# Patient Record
Sex: FEMALE | Race: OTHER | HISPANIC OR LATINO | Employment: FULL TIME | URBAN - METROPOLITAN AREA
[De-identification: names, ages, dates, MRNs, and addresses within clinical notes are randomized per-mention and may not be internally consistent; named-entity substitution may affect disease eponyms.]

---

## 2018-08-09 ENCOUNTER — HOSPITAL ENCOUNTER (EMERGENCY)
Facility: HOSPITAL | Age: 23
Discharge: HOME | End: 2018-08-09
Attending: EMERGENCY MEDICINE | Admitting: EMERGENCY MEDICINE

## 2018-08-09 VITALS
DIASTOLIC BLOOD PRESSURE: 61 MMHG | HEART RATE: 85 BPM | TEMPERATURE: 98.2 F | OXYGEN SATURATION: 100 % | SYSTOLIC BLOOD PRESSURE: 129 MMHG | RESPIRATION RATE: 16 BRPM | WEIGHT: 162.6 LBS

## 2018-08-09 DIAGNOSIS — R04.0 ANTERIOR EPISTAXIS: Primary | ICD-10-CM

## 2018-08-09 PROCEDURE — 99283 EMERGENCY DEPT VISIT LOW MDM: CPT

## 2018-08-09 RX ORDER — SODIUM CHLORIDE/ALOE VERA
SPRAY, NON-AEROSOL (ML) NASAL
Qty: 22 ML | Refills: 0 | Status: SHIPPED | OUTPATIENT
Start: 2018-08-09 | End: 2019-06-09

## 2018-08-09 RX ORDER — OXYMETAZOLINE HYDROCHLORIDE 0.05 G/100ML
2 SPRAY NASAL ONCE
Status: COMPLETED | OUTPATIENT
Start: 2018-08-09 | End: 2018-08-09

## 2018-08-09 RX ADMIN — OXYMETAZOLINE HYDROCHLORIDE 2 SPRAY: 5 SPRAY NASAL at 20:00

## 2018-08-10 NOTE — ED PROVIDER NOTES
History  Chief Complaint   Patient presents with   Darliss Card     Patient presents to ED after waking up with nose bleed this morning  Patient has a nose bleed 2 weeks ago as well and her significant other states, "this isn't normal so it needs to be checked out " Patient denies any bleeding since this morning  Patient presents emergency department with nose bleeds  She states she has not had problems with nosebleeds before  She reports 3 episodes of bleeding from the right nostril today  Each time it lasts for about 2 min and stops on its own  She states she gets a headache when she gets the nose bleed but then the headache goes away  She is not having any symptoms at this time  Denies any trauma or digital trauma  None       Past Medical History:   Diagnosis Date    Asthma        History reviewed  No pertinent surgical history  History reviewed  No pertinent family history  I have reviewed and agree with the history as documented  Social History   Substance Use Topics    Smoking status: Never Smoker    Smokeless tobacco: Never Used    Alcohol use No        Review of Systems   All other systems reviewed and are negative  Physical Exam  Physical Exam   Constitutional: She appears well-developed and well-nourished  No distress  HENT:   Head: Normocephalic and atraumatic  Mouth/Throat: Oropharynx is clear and moist    Small crust to the right nostril suspect area of bleeding no active bleeding now  No blood in pharynx  Eyes: Conjunctivae and EOM are normal    Neck: Neck supple  Cardiovascular: Normal rate, regular rhythm and normal heart sounds  Pulmonary/Chest: Effort normal and breath sounds normal    Neurological: She is alert  Skin: Skin is warm  Psychiatric: She has a normal mood and affect  Nursing note and vitals reviewed        Vital Signs  ED Triage Vitals [08/09/18 1931]   Temperature Pulse Respirations Blood Pressure SpO2   98 2 °F (36 8 °C) 85 16 129/61 100 %      Temp Source Heart Rate Source Patient Position - Orthostatic VS BP Location FiO2 (%)   Oral Monitor Sitting Right arm --      Pain Score       No Pain           Vitals:    08/09/18 1931   BP: 129/61   Pulse: 85   Patient Position - Orthostatic VS: Sitting       Visual Acuity      ED Medications  Medications   oxymetazoline (AFRIN) 0 05 % nasal spray 2 spray (2 sprays Each Nare Given 8/9/18 2000)       Diagnostic Studies  Results Reviewed     None                 No orders to display              Procedures  Procedures       Phone Contacts  ED Phone Contact    ED Course                               MDM  Number of Diagnoses or Management Options  Anterior epistaxis: new and does not require workup  Risk of Complications, Morbidity, and/or Mortality  General comments: Instructions reviewed with patient and family  Patient Progress  Patient progress: stable    CritCare Time    Disposition  Final diagnoses:   Anterior epistaxis     Time reflects when diagnosis was documented in both MDM as applicable and the Disposition within this note     Time User Action Codes Description Comment    8/9/2018  8:01 PM Rosalina Russo Add [R04 0] Anterior epistaxis       ED Disposition     ED Disposition Condition Comment    Discharge  Beny Garcia discharge to home/self care  Condition at discharge: Good        Follow-up Information     Follow up With Specialties Details Why [address]    [address] [facility],  Otolaryngology   [address] [facility] [id number] [phone]            Patient's Medications   Discharge Prescriptions    AYR SALINE NASAL NO-DRIP GEL    2 sprays each nostril as needed       Start Date: 8/9/2018  End Date: --       Order Dose: --       Quantity: 22 mL    Refills: 0     No discharge procedures on file      ED Provider  Electronically Signed by           Luba Solorio PA-C  08/09/18 2007

## 2018-08-10 NOTE — DISCHARGE INSTRUCTIONS
Afrin - 3 sprays each nostril 2x tomorrow and Saturday  If nose bleeds - pinch and hold for 5 minutes  And use afrin again  Saline nasal spray 2 sprays each nostril 7-10x daily    Epistaxis   WHAT YOU SHOULD KNOW:   Epistaxis is a nosebleed  A nosebleed occurs when the blood vessels near the surface of the nasal cavity are injured or damaged  AFTER YOU LEAVE:   Medicines:   · Nasal sprays:  Vasoconstrictor nasal spray is a medicine that helps make nasal blood vessels narrower  This limits the blood flow and stops the bleeding  This medicine also decreases the swelling inside your nose and helps you breathe easier  You may also be directed to use saline or other nasal sprays to add moisture to your nose  · Antibiotics: This medicine is given to help treat or prevent an infection caused by bacteria  · Take your medicine as directed  Call your healthcare provider if you think your medicine is not helping or if you have side effects  Tell him if you are allergic to any medicine  Keep a list of the medicines, vitamins, and herbs you take  Include the amounts, and when and why you take them  Bring the list or the pill bottles to follow-up visits  Carry your medicine list with you in case of an emergency  Follow up with your primary healthcare provider or otolaryngologist within 2 to 3 days or as directed: Any packing in your nose should be removed within 2 to 3 days  Write down your questions so you remember to ask them during your visits  First aid:   · Sit up and lean forward: This will help prevent you from swallowing blood  Spit blood and saliva into a bowl  · Apply pressure to your nose:  Use 2 fingers to pinch your nose shut for 10 minutes  This will help stop the bleeding  Breathe through your mouth  · Apply ice:  Use a cold pack or put crushed ice in a bag, cover with a towel, and place on the bridge of your nose       · Nasal packing:  Pack your nose with a cotton ball, tissue, tampon, or gauze bandage to stop the bleeding  Prevent epistaxis:  · Avoid nose picking and blowing your nose too hard: You can irritate or damage your nose if you pick it  Blowing your nose too hard may cause the bleeding to start again  · Avoid irritants:  Substances that can irritate your nose should be avoided  These include tobacco smoke and chemical sprays such as  that contain ammonia  · Use a cool mist humidifier in your home: This will add the moisture to the air and help keep your nose moist      · Put a small amount of petroleum jelly inside your nostrils: You may apply a small amount of petroleum jelly if you do not have a nasal packing  This will help keep your nose from drying out or getting irritated  Do not put anything else inside your nose unless your primary healthcare provider tells you to do so  Contact your primary healthcare provider or otolaryngologist if:   · You have a fever and are vomiting  · You have pain in and around your nose that is getting worse even after you take pain medicines  · Your nasal pack is loose  · You have questions or concerns about your condition or care  Seek care immediately if:   · Your nasal packing is soaked with blood  · Your nose is still bleeding after 20 minutes, even after you pinch it  · You have a foul-smelling discharge coming out of your nose  · You feel so weak and dizzy that you have trouble standing up  · You have trouble breathing or talking  © 2014 [address] is for End User's use only and may not be sold, redistributed or otherwise used for commercial purposes  All illustrations and images included in CareNotes® are the copyrighted property of [employer] , "[employer]  or Peter Sandra  The above information is an  only  It is not intended as medical advice for individual conditions or treatments   Talk to your doctor, nurse or pharmacist before following any medical regimen to see if it is safe and effective for you

## 2019-05-30 ENCOUNTER — HOSPITAL ENCOUNTER (EMERGENCY)
Facility: HOSPITAL | Age: 24
Discharge: HOME | End: 2019-05-30
Attending: EMERGENCY MEDICINE | Admitting: EMERGENCY MEDICINE

## 2019-05-30 VITALS
TEMPERATURE: 97.4 F | HEART RATE: 95 BPM | WEIGHT: 164.46 LBS | DIASTOLIC BLOOD PRESSURE: 58 MMHG | SYSTOLIC BLOOD PRESSURE: 111 MMHG | OXYGEN SATURATION: 100 % | RESPIRATION RATE: 18 BRPM

## 2019-05-30 DIAGNOSIS — R11.2 NAUSEA VOMITING AND DIARRHEA: ICD-10-CM

## 2019-05-30 DIAGNOSIS — R19.7 NAUSEA VOMITING AND DIARRHEA: ICD-10-CM

## 2019-05-30 DIAGNOSIS — R10.9 ACUTE ABDOMINAL PAIN: Primary | ICD-10-CM

## 2019-05-30 LAB
BACTERIA UR QL AUTO: ABNORMAL /HPF
BILIRUB UR QL STRIP: NEGATIVE
CLARITY UR: CLEAR
COLOR UR: YELLOW
EXT PREG TEST URINE: NORMAL
GLUCOSE UR STRIP-MCNC: NEGATIVE MG/DL
HGB UR QL STRIP.AUTO: ABNORMAL
KETONES UR STRIP-MCNC: NEGATIVE MG/DL
LEUKOCYTE ESTERASE UR QL STRIP: ABNORMAL
NITRITE UR QL STRIP: NEGATIVE
NON-SQ EPI CELLS URNS QL MICRO: ABNORMAL /HPF
PH UR STRIP.AUTO: 6 [PH] (ref 4.5–8)
PROT UR STRIP-MCNC: NEGATIVE MG/DL
RBC #/AREA URNS AUTO: ABNORMAL /HPF
SP GR UR STRIP.AUTO: 1.02 (ref 1–1.03)
UROBILINOGEN UR QL STRIP.AUTO: 0.2 E.U./DL
WBC #/AREA URNS AUTO: ABNORMAL /HPF

## 2019-05-30 PROCEDURE — 87491 CHLMYD TRACH DNA AMP PROBE: CPT | Performed by: EMERGENCY MEDICINE

## 2019-05-30 PROCEDURE — 81001 URINALYSIS AUTO W/SCOPE: CPT

## 2019-05-30 PROCEDURE — 87591 N.GONORRHOEAE DNA AMP PROB: CPT | Performed by: EMERGENCY MEDICINE

## 2019-05-30 PROCEDURE — 99283 EMERGENCY DEPT VISIT LOW MDM: CPT | Performed by: EMERGENCY MEDICINE

## 2019-05-30 PROCEDURE — 81025 URINE PREGNANCY TEST: CPT | Performed by: EMERGENCY MEDICINE

## 2019-05-30 PROCEDURE — 99284 EMERGENCY DEPT VISIT MOD MDM: CPT

## 2019-05-30 RX ORDER — ONDANSETRON 4 MG/1
4 TABLET, ORALLY DISINTEGRATING ORAL ONCE
Status: COMPLETED | OUTPATIENT
Start: 2019-05-30 | End: 2019-05-30

## 2019-05-30 RX ORDER — DICYCLOMINE HCL 20 MG
20 TABLET ORAL ONCE
Status: COMPLETED | OUTPATIENT
Start: 2019-05-30 | End: 2019-05-30

## 2019-05-30 RX ORDER — DICYCLOMINE HCL 20 MG
20 TABLET ORAL 2 TIMES DAILY
Qty: 20 TABLET | Refills: 0 | Status: SHIPPED | OUTPATIENT
Start: 2019-05-30 | End: 2021-01-26

## 2019-05-30 RX ORDER — ONDANSETRON 4 MG/1
4 TABLET, FILM COATED ORAL EVERY 8 HOURS PRN
Qty: 7 TABLET | Refills: 0 | Status: SHIPPED | OUTPATIENT
Start: 2019-05-30 | End: 2019-06-09

## 2019-05-30 RX ADMIN — DICYCLOMINE HYDROCHLORIDE 20 MG: 20 TABLET ORAL at 11:29

## 2019-05-30 RX ADMIN — ONDANSETRON 4 MG: 4 TABLET, ORALLY DISINTEGRATING ORAL at 11:29

## 2019-05-31 LAB
C TRACH DNA SPEC QL NAA+PROBE: POSITIVE
N GONORRHOEA DNA SPEC QL NAA+PROBE: NEGATIVE

## 2019-06-09 ENCOUNTER — APPOINTMENT (EMERGENCY)
Dept: ULTRASOUND IMAGING | Facility: HOSPITAL | Age: 24
End: 2019-06-09

## 2019-06-09 ENCOUNTER — APPOINTMENT (EMERGENCY)
Dept: CT IMAGING | Facility: HOSPITAL | Age: 24
End: 2019-06-09

## 2019-06-09 ENCOUNTER — HOSPITAL ENCOUNTER (EMERGENCY)
Facility: HOSPITAL | Age: 24
Discharge: HOME | End: 2019-06-09
Attending: EMERGENCY MEDICINE | Admitting: EMERGENCY MEDICINE

## 2019-06-09 VITALS
SYSTOLIC BLOOD PRESSURE: 118 MMHG | TEMPERATURE: 97.5 F | HEART RATE: 95 BPM | DIASTOLIC BLOOD PRESSURE: 68 MMHG | OXYGEN SATURATION: 100 % | WEIGHT: 167.11 LBS | RESPIRATION RATE: 16 BRPM

## 2019-06-09 DIAGNOSIS — N83.209 RUPTURED OVARIAN CYST: Primary | ICD-10-CM

## 2019-06-09 DIAGNOSIS — K66.1 HEMOPERITONEUM: ICD-10-CM

## 2019-06-09 LAB
ALBUMIN SERPL BCP-MCNC: 3.9 G/DL (ref 3.5–5)
ALP SERPL-CCNC: 68 U/L (ref 46–116)
ALT SERPL W P-5'-P-CCNC: 33 U/L (ref 12–78)
ANION GAP SERPL CALCULATED.3IONS-SCNC: 12 MMOL/L (ref 4–13)
AST SERPL W P-5'-P-CCNC: 16 U/L (ref 5–45)
BACTERIA UR QL AUTO: ABNORMAL /HPF
BASOPHILS # BLD AUTO: 0.06 THOUSANDS/ΜL (ref 0–0.1)
BASOPHILS NFR BLD AUTO: 1 % (ref 0–1)
BILIRUB SERPL-MCNC: 0.2 MG/DL (ref 0.2–1)
BILIRUB UR QL STRIP: NEGATIVE
BILIRUB UR QL STRIP: NEGATIVE
BUN SERPL-MCNC: 8 MG/DL (ref 5–25)
CALCIUM SERPL-MCNC: 9 MG/DL (ref 8.3–10.1)
CHLORIDE SERPL-SCNC: 105 MMOL/L (ref 100–108)
CLARITY UR: ABNORMAL
CLARITY UR: CLEAR
CO2 SERPL-SCNC: 22 MMOL/L (ref 21–32)
COLOR UR: YELLOW
COLOR UR: YELLOW
CREAT SERPL-MCNC: 0.64 MG/DL (ref 0.6–1.3)
EOSINOPHIL # BLD AUTO: 0.39 THOUSAND/ΜL (ref 0–0.61)
EOSINOPHIL NFR BLD AUTO: 4 % (ref 0–6)
ERYTHROCYTE [DISTWIDTH] IN BLOOD BY AUTOMATED COUNT: 12.8 % (ref 11.6–15.1)
EXT PREG TEST URINE: NEGATIVE
EXT PREG TEST URINE: NEGATIVE
GFR SERPL CREATININE-BSD FRML MDRD: 128 ML/MIN/1.73SQ M
GLUCOSE SERPL-MCNC: 100 MG/DL (ref 65–140)
GLUCOSE UR STRIP-MCNC: NEGATIVE MG/DL
GLUCOSE UR STRIP-MCNC: NEGATIVE MG/DL
HCT VFR BLD AUTO: 45.8 % (ref 34.8–46.1)
HGB BLD-MCNC: 15 G/DL (ref 11.5–15.4)
HGB UR QL STRIP.AUTO: NEGATIVE
HGB UR QL STRIP.AUTO: NEGATIVE
IMM GRANULOCYTES # BLD AUTO: 0.03 THOUSAND/UL (ref 0–0.2)
IMM GRANULOCYTES NFR BLD AUTO: 0 % (ref 0–2)
KETONES UR STRIP-MCNC: NEGATIVE MG/DL
KETONES UR STRIP-MCNC: NEGATIVE MG/DL
LEUKOCYTE ESTERASE UR QL STRIP: ABNORMAL
LEUKOCYTE ESTERASE UR QL STRIP: NEGATIVE
LIPASE SERPL-CCNC: 93 U/L (ref 73–393)
LYMPHOCYTES # BLD AUTO: 1.88 THOUSANDS/ΜL (ref 0.6–4.47)
LYMPHOCYTES NFR BLD AUTO: 19 % (ref 14–44)
MCH RBC QN AUTO: 31.1 PG (ref 26.8–34.3)
MCHC RBC AUTO-ENTMCNC: 32.8 G/DL (ref 31.4–37.4)
MCV RBC AUTO: 95 FL (ref 82–98)
MONOCYTES # BLD AUTO: 0.42 THOUSAND/ΜL (ref 0.17–1.22)
MONOCYTES NFR BLD AUTO: 4 % (ref 4–12)
NEUTROPHILS # BLD AUTO: 6.92 THOUSANDS/ΜL (ref 1.85–7.62)
NEUTS SEG NFR BLD AUTO: 72 % (ref 43–75)
NITRITE UR QL STRIP: NEGATIVE
NITRITE UR QL STRIP: NEGATIVE
NON-SQ EPI CELLS URNS QL MICRO: ABNORMAL /HPF
NRBC BLD AUTO-RTO: 0 /100 WBCS
PH UR STRIP.AUTO: 8.5 [PH] (ref 4.5–8)
PH UR STRIP.AUTO: >=9 [PH] (ref 4.5–8)
PLATELET # BLD AUTO: 320 THOUSANDS/UL (ref 149–390)
PMV BLD AUTO: 9.1 FL (ref 8.9–12.7)
POTASSIUM SERPL-SCNC: 3.5 MMOL/L (ref 3.5–5.3)
PROT SERPL-MCNC: 7.6 G/DL (ref 6.4–8.2)
PROT UR STRIP-MCNC: ABNORMAL MG/DL
PROT UR STRIP-MCNC: NEGATIVE MG/DL
RBC # BLD AUTO: 4.83 MILLION/UL (ref 3.81–5.12)
RBC #/AREA URNS AUTO: ABNORMAL /HPF
SODIUM SERPL-SCNC: 139 MMOL/L (ref 136–145)
SP GR UR STRIP.AUTO: 1.01 (ref 1–1.03)
SP GR UR STRIP.AUTO: 1.02 (ref 1–1.03)
UROBILINOGEN UR QL STRIP.AUTO: 0.2 E.U./DL
UROBILINOGEN UR QL STRIP.AUTO: 0.2 E.U./DL
WBC # BLD AUTO: 9.7 THOUSAND/UL (ref 4.31–10.16)
WBC #/AREA URNS AUTO: ABNORMAL /HPF

## 2019-06-09 PROCEDURE — 96374 THER/PROPH/DIAG INJ IV PUSH: CPT

## 2019-06-09 PROCEDURE — 74177 CT ABD & PELVIS W/CONTRAST: CPT

## 2019-06-09 PROCEDURE — 81025 URINE PREGNANCY TEST: CPT | Performed by: EMERGENCY MEDICINE

## 2019-06-09 PROCEDURE — 36415 COLL VENOUS BLD VENIPUNCTURE: CPT | Performed by: PHYSICIAN ASSISTANT

## 2019-06-09 PROCEDURE — 76830 TRANSVAGINAL US NON-OB: CPT

## 2019-06-09 PROCEDURE — 99284 EMERGENCY DEPT VISIT MOD MDM: CPT | Performed by: PHYSICIAN ASSISTANT

## 2019-06-09 PROCEDURE — 96375 TX/PRO/DX INJ NEW DRUG ADDON: CPT

## 2019-06-09 PROCEDURE — 96361 HYDRATE IV INFUSION ADD-ON: CPT

## 2019-06-09 PROCEDURE — 81025 URINE PREGNANCY TEST: CPT | Performed by: PHYSICIAN ASSISTANT

## 2019-06-09 PROCEDURE — 99284 EMERGENCY DEPT VISIT MOD MDM: CPT

## 2019-06-09 PROCEDURE — 80053 COMPREHEN METABOLIC PANEL: CPT | Performed by: PHYSICIAN ASSISTANT

## 2019-06-09 PROCEDURE — 83690 ASSAY OF LIPASE: CPT | Performed by: PHYSICIAN ASSISTANT

## 2019-06-09 PROCEDURE — 81001 URINALYSIS AUTO W/SCOPE: CPT

## 2019-06-09 PROCEDURE — 96376 TX/PRO/DX INJ SAME DRUG ADON: CPT

## 2019-06-09 PROCEDURE — 85025 COMPLETE CBC W/AUTO DIFF WBC: CPT | Performed by: PHYSICIAN ASSISTANT

## 2019-06-09 PROCEDURE — 76856 US EXAM PELVIC COMPLETE: CPT

## 2019-06-09 RX ORDER — IBUPROFEN 600 MG/1
600 TABLET ORAL EVERY 6 HOURS PRN
Qty: 30 TABLET | Refills: 0 | Status: SHIPPED | OUTPATIENT
Start: 2019-06-09 | End: 2019-06-09 | Stop reason: SDUPTHER

## 2019-06-09 RX ORDER — IBUPROFEN 600 MG/1
600 TABLET ORAL EVERY 6 HOURS PRN
Qty: 30 TABLET | Refills: 0 | Status: SHIPPED | OUTPATIENT
Start: 2019-06-09 | End: 2021-01-26

## 2019-06-09 RX ORDER — ONDANSETRON 4 MG/1
4 TABLET, ORALLY DISINTEGRATING ORAL EVERY 8 HOURS PRN
Qty: 10 TABLET | Refills: 0 | Status: SHIPPED | OUTPATIENT
Start: 2019-06-09 | End: 2021-01-26

## 2019-06-09 RX ORDER — ONDANSETRON 2 MG/ML
4 INJECTION INTRAMUSCULAR; INTRAVENOUS ONCE
Status: COMPLETED | OUTPATIENT
Start: 2019-06-09 | End: 2019-06-09

## 2019-06-09 RX ORDER — KETOROLAC TROMETHAMINE 30 MG/ML
15 INJECTION, SOLUTION INTRAMUSCULAR; INTRAVENOUS ONCE
Status: COMPLETED | OUTPATIENT
Start: 2019-06-09 | End: 2019-06-09

## 2019-06-09 RX ORDER — ONDANSETRON 4 MG/1
4 TABLET, ORALLY DISINTEGRATING ORAL EVERY 8 HOURS PRN
Qty: 10 TABLET | Refills: 0 | Status: SHIPPED | OUTPATIENT
Start: 2019-06-09 | End: 2019-06-09 | Stop reason: SDUPTHER

## 2019-06-09 RX ADMIN — ONDANSETRON 4 MG: 2 INJECTION INTRAMUSCULAR; INTRAVENOUS at 07:14

## 2019-06-09 RX ADMIN — KETOROLAC TROMETHAMINE 15 MG: 30 INJECTION, SOLUTION INTRAMUSCULAR; INTRAVENOUS at 07:14

## 2019-06-09 RX ADMIN — SODIUM CHLORIDE 1000 ML: 0.9 INJECTION, SOLUTION INTRAVENOUS at 07:13

## 2019-06-09 RX ADMIN — IOHEXOL 100 ML: 350 INJECTION, SOLUTION INTRAVENOUS at 08:28

## 2019-06-09 RX ADMIN — ONDANSETRON 4 MG: 2 INJECTION INTRAMUSCULAR; INTRAVENOUS at 08:36

## 2019-06-12 ENCOUNTER — OFFICE VISIT (OUTPATIENT)
Dept: OBGYN CLINIC | Facility: CLINIC | Age: 24
End: 2019-06-12

## 2019-06-12 VITALS — DIASTOLIC BLOOD PRESSURE: 75 MMHG | HEART RATE: 99 BPM | WEIGHT: 162.4 LBS | SYSTOLIC BLOOD PRESSURE: 122 MMHG

## 2019-06-12 DIAGNOSIS — N83.201 HEMORRHAGIC CYST OF RIGHT OVARY: Chronic | ICD-10-CM

## 2019-06-12 DIAGNOSIS — N83.201 CYST OF RIGHT OVARY: Primary | ICD-10-CM

## 2019-06-12 PROCEDURE — 99213 OFFICE O/P EST LOW 20 MIN: CPT | Performed by: OBSTETRICS & GYNECOLOGY

## 2019-06-12 RX ORDER — DOCUSATE SODIUM 100 MG/1
100 CAPSULE, LIQUID FILLED ORAL 2 TIMES DAILY
Qty: 10 CAPSULE | Refills: 0 | Status: SHIPPED | OUTPATIENT
Start: 2019-06-12 | End: 2021-01-26

## 2019-07-09 ENCOUNTER — OFFICE VISIT (OUTPATIENT)
Dept: OBGYN CLINIC | Facility: CLINIC | Age: 24
End: 2019-07-09

## 2019-07-09 VITALS — SYSTOLIC BLOOD PRESSURE: 115 MMHG | WEIGHT: 163 LBS | DIASTOLIC BLOOD PRESSURE: 76 MMHG | HEART RATE: 83 BPM

## 2019-07-09 DIAGNOSIS — Z01.419 ENCOUNTER FOR ANNUAL ROUTINE GYNECOLOGICAL EXAMINATION: Primary | ICD-10-CM

## 2019-07-09 PROCEDURE — 99385 PREV VISIT NEW AGE 18-39: CPT | Performed by: NURSE PRACTITIONER

## 2019-07-09 NOTE — PROGRESS NOTES
Annual Exam    Assessment   1  Encounter for annual routine gynecological examination  CANCELED: Liquid-based pap, screening     well woman       Plan       All questions answered  Breast self exam technique reviewed and patient encouraged to perform self-exam monthly  Contraception: OCP (estrogen/progesterone)  Discussed healthy lifestyle modifications  Follow up in 1 year  Thin prep Pap smear  Patient Instructions   PAP results will be avaialble in 2 weeks  Call with needs or concerns  Return in 1 year   Pt and spouse verbalized understanding of all discussed    Subjective      Aida Daley is a 27 y o  No obstetric history on file  female who presents for annual well woman exam with her spouse Periods are regular every 28-30 days, lasting 5 days  No intermenstrual bleeding, spotting, or discharge  Pt states she thinks last WNL PAP was [facility] ago [facility]  Pt was here in  and had a C/O pelvic pain a 2 cm hemorrhagic and 1 cm simple cyst ovarian cyst was noted, pt was put on OCP's patient states pain has resolved, would like to continue OCP's  Current contraception: OCP (estrogen/progesterone)  History of abnormal Pap smear: no  Family history of uterine or ovarian cancer: no  Regular self breast exam: yes  History of abnormal mammogram: N/A  Family history of breast cancer: no  History of abnormal lipids: unknown  Menstrual History:  OB History        0    Para   0    Term   0       0    AB   0    Living   0       SAB   0    TAB   0    Ectopic   0    Multiple   0    Live Births   0                Menarche age: 17  Patient's last menstrual period was 2019  The following portions of the patient's history were reviewed and updated as appropriate: allergies, current medications, past family history, past medical history, past social history, past surgical history and problem list     Review of Systems  Pertinent items are noted in HPI        Objective /76   Pulse 83   Wt 73 9 kg (163 lb)   LMP 06/22/2019     General: alert and oriented, in no acute distress, alert, appears stated age and cooperative   Heart: regular rate and rhythm, S1, S2 normal, no murmur, click, rub or gallop   Lungs: clear to auscultation bilaterally   Thyroid: Negative masses   Abdomen: soft, non-tender, without masses or organomegaly   Vulva: Negative lesions or erythema   Vagina: normal mucosa   Cervix: no cervical motion tenderness and no lesions   Uterus: normal size, non-tender, normal shape and consistency   Adnexa: normal adnexa   Urethra: normal   Breasts: NT,negative masses, discharge, or dimpling

## 2019-07-17 PROBLEM — R87.610 ATYPICAL SQUAMOUS CELLS OF UNDETERMINED SIGNIFICANCE (ASCUS) ON PAPANICOLAOU SMEAR OF CERVIX: Status: ACTIVE | Noted: 2019-07-17

## 2019-07-25 ENCOUNTER — TELEPHONE (OUTPATIENT)
Dept: OBGYN CLINIC | Facility: CLINIC | Age: 24
End: 2019-07-25

## 2019-09-24 ENCOUNTER — OFFICE VISIT (OUTPATIENT)
Dept: OBGYN CLINIC | Facility: CLINIC | Age: 24
End: 2019-09-24

## 2019-09-24 VITALS — DIASTOLIC BLOOD PRESSURE: 66 MMHG | SYSTOLIC BLOOD PRESSURE: 99 MMHG | HEART RATE: 84 BPM | WEIGHT: 156.8 LBS

## 2019-09-24 DIAGNOSIS — N83.201 CYST OF RIGHT OVARY: ICD-10-CM

## 2019-09-24 DIAGNOSIS — R87.610 ATYPICAL SQUAMOUS CELLS OF UNDETERMINED SIGNIFICANCE (ASCUS) ON PAPANICOLAOU SMEAR OF CERVIX: Primary | ICD-10-CM

## 2019-09-24 PROCEDURE — 99213 OFFICE O/P EST LOW 20 MIN: CPT | Performed by: OBSTETRICS & GYNECOLOGY

## 2019-09-24 NOTE — PROGRESS NOTES
Subjective:      #: [id number]    Haylie Hargrove is a 27 y o  [de-identified] female who presents for pap smear results and birth control refill  Patient was started on OCP 6/12/2019 after being evaluated in ED for pelvic pain and was found to have a hemorrhagic cyst and a simple cyst  Patient states her pelvic pain has subsided and she is doing well  She is doing well with her OCP and would like one year refill  Objective:    Vitals: Blood pressure 99/66, pulse 84, weight 71 1 kg (156 lb 12 8 oz)  There is no height or weight on file to calculate BMI      Assessment/Plan:    Pelvic pain, resolved  Patient would like to continue Lo/Ovral for contraception, script given for 1 year refill    ASCUS, HPV unknown  According to ASCCP guidelines, patient needs repeat cytology in 1 year  She will return for both pap smear and OCP refill in 1 year    Lulu Martin MD  OBGYN, PGY-3  9/24/2019  9:12 AM

## 2020-07-16 ENCOUNTER — TELEPHONE (OUTPATIENT)
Dept: OBGYN CLINIC | Facility: CLINIC | Age: 25
End: 2020-07-16

## 2020-07-16 NOTE — TELEPHONE ENCOUNTER
Malaika at the  called the patient to schedule an annual  Patient is going to make her apt through the B

## 2021-01-26 ENCOUNTER — HOSPITAL ENCOUNTER (EMERGENCY)
Facility: HOSPITAL | Age: 26
Discharge: HOME | End: 2021-01-26
Attending: EMERGENCY MEDICINE

## 2021-01-26 VITALS
DIASTOLIC BLOOD PRESSURE: 67 MMHG | TEMPERATURE: 99 F | OXYGEN SATURATION: 99 % | SYSTOLIC BLOOD PRESSURE: 131 MMHG | RESPIRATION RATE: 16 BRPM | HEART RATE: 108 BPM | WEIGHT: 168.43 LBS

## 2021-01-26 DIAGNOSIS — J02.9 PHARYNGITIS: ICD-10-CM

## 2021-01-26 DIAGNOSIS — B34.9 VIRAL ILLNESS: Primary | ICD-10-CM

## 2021-01-26 DIAGNOSIS — J02.0 STREP PHARYNGITIS: ICD-10-CM

## 2021-01-26 LAB
FLUAV RNA RESP QL NAA+PROBE: NEGATIVE
FLUBV RNA RESP QL NAA+PROBE: NEGATIVE
RSV RNA RESP QL NAA+PROBE: NEGATIVE
S PYO DNA THROAT QL NAA+PROBE: DETECTED
SARS-COV-2 RNA RESP QL NAA+PROBE: NEGATIVE

## 2021-01-26 PROCEDURE — 99283 EMERGENCY DEPT VISIT LOW MDM: CPT

## 2021-01-26 PROCEDURE — 99284 EMERGENCY DEPT VISIT MOD MDM: CPT | Performed by: PHYSICIAN ASSISTANT

## 2021-01-26 PROCEDURE — 0241U HB NFCT DS VIR RESP RNA 4 TRGT: CPT | Performed by: PHYSICIAN ASSISTANT

## 2021-01-26 PROCEDURE — 87651 STREP A DNA AMP PROBE: CPT | Performed by: PHYSICIAN ASSISTANT

## 2021-01-26 RX ORDER — AMOXICILLIN 500 MG/1
500 CAPSULE ORAL EVERY 12 HOURS SCHEDULED
Qty: 20 CAPSULE | Refills: 0 | Status: SHIPPED | OUTPATIENT
Start: 2021-01-26 | End: 2021-02-05

## 2021-01-26 RX ORDER — IBUPROFEN 600 MG/1
600 TABLET ORAL ONCE
Status: COMPLETED | OUTPATIENT
Start: 2021-01-26 | End: 2021-01-26

## 2021-01-26 RX ADMIN — IBUPROFEN 600 MG: 600 TABLET, FILM COATED ORAL at 08:28

## 2021-01-26 NOTE — Clinical Note
Oanh Nice was seen and treated in our emergency department on 1/26/2021  Diagnosis:      [place]     She may return on this date:      Patient must quarantine while awaiting Covid-19 test results  If negative, she may return to work after 3 days without symptoms  If you have any questions or concerns, please don't hesitate to call        Landon Hair PA-C    ______________________________           _______________          _______________  Hospital Representative                              Date                                Time

## 2021-01-26 NOTE — ED PROVIDER NOTES
History  Chief Complaint   Patient presents with    Flu Symptoms     pt c/o chills, body aches, subjective fevers and sore throat since yesterday, denies covid exposure  took tylenol at 0200 today and theraflu this am      Patient is a 24 y/o female presenting to the ED with a sore throat, generalized body aches chills and subjective fevers x1 day  She is having difficulty swallowing due to the pain in her throat  She took Tylenol at 0200 and Theraflu 1 hour prior to coming to ED with some relief of symptoms  Patient denies any known covid exposures  She denies dizziness, headaches, chest pain, SOB, palpitations, abdominal pain or N/V/D  None       Past Medical History:   Diagnosis Date    Asthma        History reviewed  No pertinent surgical history  History reviewed  No pertinent family history  I have reviewed and agree with the history as documented  E-Cigarette/Vaping    E-Cigarette Use Never User      E-Cigarette/Vaping Substances     Social History     Tobacco Use    Smoking status: Never Smoker    Smokeless tobacco: Never Used   Substance Use Topics    Alcohol use: Yes     Comment: social    Drug use: No       Review of Systems   Constitutional: Positive for chills, fatigue and fever  HENT: Positive for sore throat  Negative for congestion, ear pain, rhinorrhea, sinus pressure, sinus pain and sneezing  Eyes: Negative for photophobia, discharge, redness, itching and visual disturbance  Respiratory: Negative for cough, chest tightness and shortness of breath  Cardiovascular: Negative for chest pain, palpitations and leg swelling  Gastrointestinal: Negative for abdominal distention, abdominal pain, blood in stool, constipation, diarrhea, nausea and vomiting  Genitourinary: Negative for dysuria, flank pain, frequency and urgency  Musculoskeletal: Positive for myalgias  Negative for arthralgias, joint swelling and neck stiffness  Skin: Negative for pallor and rash  Neurological: Negative for dizziness, syncope, weakness, light-headedness and headaches  All other systems reviewed and are negative  Physical Exam  Physical Exam  Vitals signs and nursing note reviewed  Constitutional:       General: She is awake  She is not in acute distress  Appearance: Normal appearance  She is well-developed  HENT:      Head: Normocephalic and atraumatic  Nose: Nose normal       Mouth/Throat:      Lips: Pink  Mouth: Mucous membranes are moist  No oral lesions  Pharynx: Posterior oropharyngeal erythema present  No pharyngeal swelling, oropharyngeal exudate or uvula swelling  Eyes:      Conjunctiva/sclera: Conjunctivae normal    Neck:      Musculoskeletal: Normal range of motion and neck supple  Cardiovascular:      Rate and Rhythm: Normal rate and regular rhythm  Pulses: Normal pulses  Heart sounds: Normal heart sounds, S1 normal and S2 normal    Pulmonary:      Effort: Pulmonary effort is normal  No accessory muscle usage or respiratory distress  Breath sounds: Normal breath sounds  No decreased breath sounds, wheezing, rhonchi or rales  Abdominal:      General: Abdomen is flat  Bowel sounds are normal       Palpations: Abdomen is soft  Tenderness: There is no abdominal tenderness  Musculoskeletal:      Right lower leg: No edema  Left lower leg: No edema  Lymphadenopathy:      Cervical: No cervical adenopathy  Skin:     General: Skin is warm and dry  Capillary Refill: Capillary refill takes less than 2 seconds  Coloration: Skin is not ashen, cyanotic or pale  Neurological:      Mental Status: She is alert and oriented to person, place, and time  GCS: GCS eye subscore is 4  GCS verbal subscore is 5  GCS motor subscore is 6  Psychiatric:         Behavior: Behavior is cooperative           Vital Signs  ED Triage Vitals [01/26/21 0748]   Temperature Pulse Respirations Blood Pressure SpO2   99 °F (37 2 °C) (!) 113 16 131/67 99 %      Temp Source Heart Rate Source Patient Position - Orthostatic VS BP Location FiO2 (%)   Temporal Monitor Sitting Right arm --      Pain Score       9           Vitals:    01/26/21 0748 01/26/21 0830   BP: 131/67    Pulse: (!) 113 (!) 108   Patient Position - Orthostatic VS: Sitting          Visual Acuity      ED Medications  Medications   ibuprofen (MOTRIN) tablet 600 mg (600 mg Oral Given 1/26/21 0828)       Diagnostic Studies  Results Reviewed     Procedure Component Value Units Date/Time    COVID19, Influenza A/B, RSV PCR, [facility] [[id number]] Collected: 01/26/21 0826    Lab Status: In process Specimen: Nares from Nasopharyngeal Swab Updated: 01/26/21 0829    Strep A PCR [[id number]] Collected: 01/26/21 0826    Lab Status: In process Specimen: Throat Updated: 01/26/21 0829                 No orders to display              Procedures  Procedures         ED Course                             SBIRT 22yo+      Most Recent Value   SBIRT (25 yo +)   In order to provide better care to our patients, we are screening all of our patients for alcohol and drug use  Would it be okay to ask you these screening questions? Yes Filed at: 01/26/2021 2503   Initial Alcohol Screen: US AUDIT-C    1  How often do you have a drink containing alcohol? 2 Filed at: 01/26/2021 0832   2  How many drinks containing alcohol do you have on a typical day you are drinking? 1 Filed at: 01/26/2021 0832   3b  FEMALE Any Age, or MALE 65+: How often do you have 4 or more drinks on one occassion? 0 Filed at: 01/26/2021 2354   Audit-C Score  3 Filed at: 01/26/2021 2998   ISAIAS: How many times in the past year have you    Used an illegal drug or used a prescription medication for non-medical reasons?   Never Filed at: 01/26/2021 1342                    MDM  Number of Diagnoses or Management Options  Pharyngitis:   Viral illness:   Diagnosis management comments: Patient presented to the ED with a sore throat, body aches and subjective fevers x1 day  Covid/influenza/RSV and strep PCR panels ordered  The patient is not hypoxic, is not in respiratory distress, lungs are clear, oxygen saturation is >92% on room air, well hydrated and is nontoxic appearing  Patient's symptoms are most consistent with a viral process  Patient informed that if strep result was positive an antibiotic would be sent to her pharmacy and that someone else should pick it up for her  Patient educated to self isolate/quarantine at home away from family members for 14 days and until symptoms are completely resolved for at least 3 days  Patient is medically stable for discharge home  Patient was instructed on infection prevention, to stay well-hydrated, and control fevers/bodyaches with OTC anti-pyretics  Strict return precautions were given including, but not limited to difficulty breathing, dizziness, or worsening symptoms  Patient demonstrated understanding and agreement with plan         Amount and/or Complexity of Data Reviewed  Clinical lab tests: ordered    Patient Progress  Patient progress: stable      Disposition  Final diagnoses:   Viral illness   Pharyngitis     Time reflects when diagnosis was documented in both MDM as applicable and the Disposition within this note     Time User Action Codes Description Comment    1/26/2021  8:15 AM Ila Lincoln [R68 89] Flu-like symptoms     1/26/2021  8:15 AM Darleen Jernigan [J02 9] Pharyngitis     1/26/2021  8:29 AM Darleen Booth [Z20 822] Suspected COVID-19 virus infection     1/26/2021  8:29 AM Darleen Jernigan [J02 9] Pharyngitis     1/26/2021  8:29 AM Radha Booth Remove [Z20 822] Suspected COVID-19 virus infection     1/26/2021  8:29 AM Darleen Booth [R68 89] Flu-like symptoms     1/26/2021  8:29 AM Darleen Jernigan [B34 9] Viral illness     1/26/2021  8:29 AM Ila Lincoln [J02 9] Pharyngitis     1/26/2021  8:29 AM Darleen Jernigan [Z20 822] Suspected COVID-19 virus infection     1/26/2021  8:30 AM Darleen Booth [Z20 822] Suspected COVID-19 virus infection       ED Disposition     ED Disposition Condition Date/Time Comment    Discharge Stable Tue Jan 26, 2021  8:15 AM Charlotte Phillips discharge to home/self care  Follow-up Information     Follow up With Specialties Details Why Contact Info Additional Information    [address] Emergency Medicine  If symptoms worsen [facility] [id number]  [address] [place] [address] Emergency Department, [address] [place] [address], [address] [place] [address] Family Medicine Call   [address], [address] [address]  [address], [address], [address] [place], [place], [address]          Patient's Medications   Discharge Prescriptions    No medications on file     No discharge procedures on file      PDMP Review     None          ED Provider  Electronically Signed by           Lori Mejia PA-C  01/26/21 0321

## 2021-01-26 NOTE — Clinical Note
Fozia Mora was seen and treated in our emergency department on 1/26/2021  Diagnosis:     [place]    She may return on this date:     Patient must quarantine while awaiting Covid-19 test results  If negative, she may return to work after 3 days without symptoms  If you have any questions or concerns, please don't hesitate to call        Amber Jefferson PA-C    ______________________________           _______________          _______________  Hospital Representative                              Date                                Time

## 2021-03-25 ENCOUNTER — HOSPITAL ENCOUNTER (EMERGENCY)
Facility: HOSPITAL | Age: 26
Discharge: HOME | End: 2021-03-25
Attending: EMERGENCY MEDICINE | Admitting: EMERGENCY MEDICINE

## 2021-03-25 VITALS
TEMPERATURE: 98.3 F | SYSTOLIC BLOOD PRESSURE: 113 MMHG | OXYGEN SATURATION: 100 % | RESPIRATION RATE: 18 BRPM | HEART RATE: 95 BPM | DIASTOLIC BLOOD PRESSURE: 62 MMHG | WEIGHT: 170.64 LBS

## 2021-03-25 DIAGNOSIS — G43.909 MIGRAINE HEADACHE: Primary | ICD-10-CM

## 2021-03-25 PROCEDURE — 99284 EMERGENCY DEPT VISIT MOD MDM: CPT | Performed by: EMERGENCY MEDICINE

## 2021-03-25 PROCEDURE — 96361 HYDRATE IV INFUSION ADD-ON: CPT

## 2021-03-25 PROCEDURE — 99283 EMERGENCY DEPT VISIT LOW MDM: CPT

## 2021-03-25 PROCEDURE — 96374 THER/PROPH/DIAG INJ IV PUSH: CPT

## 2021-03-25 PROCEDURE — 96375 TX/PRO/DX INJ NEW DRUG ADDON: CPT

## 2021-03-25 RX ORDER — ONDANSETRON 4 MG/1
4 TABLET, ORALLY DISINTEGRATING ORAL EVERY 8 HOURS PRN
Qty: 10 TABLET | Refills: 0 | Status: SHIPPED | OUTPATIENT
Start: 2021-03-25 | End: 2022-01-24 | Stop reason: ALTCHOICE

## 2021-03-25 RX ORDER — DIPHENHYDRAMINE HYDROCHLORIDE 50 MG/ML
12.5 INJECTION INTRAMUSCULAR; INTRAVENOUS ONCE
Status: COMPLETED | OUTPATIENT
Start: 2021-03-25 | End: 2021-03-25

## 2021-03-25 RX ORDER — NAPROXEN 500 MG/1
500 TABLET ORAL EVERY 12 HOURS PRN
Qty: 15 TABLET | Refills: 0 | Status: SHIPPED | OUTPATIENT
Start: 2021-03-25 | End: 2022-01-24 | Stop reason: ALTCHOICE

## 2021-03-25 RX ORDER — METOCLOPRAMIDE HYDROCHLORIDE 5 MG/ML
10 INJECTION INTRAMUSCULAR; INTRAVENOUS ONCE
Status: COMPLETED | OUTPATIENT
Start: 2021-03-25 | End: 2021-03-25

## 2021-03-25 RX ORDER — KETOROLAC TROMETHAMINE 30 MG/ML
15 INJECTION, SOLUTION INTRAMUSCULAR; INTRAVENOUS ONCE
Status: COMPLETED | OUTPATIENT
Start: 2021-03-25 | End: 2021-03-25

## 2021-03-25 RX ADMIN — METOCLOPRAMIDE 10 MG: 5 INJECTION, SOLUTION INTRAMUSCULAR; INTRAVENOUS at 21:57

## 2021-03-25 RX ADMIN — SODIUM CHLORIDE 1000 ML: 0.9 INJECTION, SOLUTION INTRAVENOUS at 21:55

## 2021-03-25 RX ADMIN — KETOROLAC TROMETHAMINE 15 MG: 30 INJECTION, SOLUTION INTRAMUSCULAR; INTRAVENOUS at 21:59

## 2021-03-25 RX ADMIN — DIPHENHYDRAMINE HYDROCHLORIDE 12.5 MG: 50 INJECTION, SOLUTION INTRAMUSCULAR; INTRAVENOUS at 21:55

## 2021-03-26 NOTE — ED PROVIDER NOTES
History  Chief Complaint   Patient presents with    Headache     Pt reports "bad headache"     81-year-old female with history of asthma, migraine headaches presents to the emergency department with typical migraine headache that started this morning  She states the headache is on both sides of her head and radiates to the back of her head  She has had 4 episodes of  No visual changes, fevers, neck stiffness or focal deficits  She took Tylenol without relief  She states she has never been to a neurologist for formal diagnosis of migraines or treatment  History provided by:  Patient and spouse   used: Yes    Headache - Recurrent or Known Dx Migraines  Pain location:  R parietal and L parietal  Quality: pounding  Radiates to:  Does not radiate  Severity currently:  10/10  Severity at highest:  10/10  Onset quality:  Gradual  Duration:  12 hours  Timing:  Constant  Progression:  Unchanged  Chronicity:  New  Similar to prior headaches: yes    Relieved by:  Nothing  Worsened by:  Light  Ineffective treatments:  Acetaminophen  Associated symptoms: nausea, photophobia and vomiting    Associated symptoms: no abdominal pain, no back pain, no blurred vision, no congestion, no cough, no diarrhea, no dizziness, no drainage, no ear pain, no eye pain, no facial pain, no fatigue, no fever, no focal weakness, no hearing loss, no loss of balance, no myalgias, no near-syncope, no neck pain, no neck stiffness, no numbness, no paresthesias, no seizures, no sinus pressure, no sore throat, no swollen glands, no syncope, no tingling, no URI, no visual change and no weakness    Vomiting:     Quality:  Stomach contents    Number of occurrences:  4      None       Past Medical History:   Diagnosis Date    Asthma        History reviewed  No pertinent surgical history  History reviewed  No pertinent family history  I have reviewed and agree with the history as documented      E-Cigarette/Vaping    E-Cigarette Use Never User      E-Cigarette/Vaping Substances     Social History     Tobacco Use    Smoking status: Never Smoker    Smokeless tobacco: Never Used   Substance Use Topics    Alcohol use: Yes     Comment: social    Drug use: No       Review of Systems   Constitutional: Negative  Negative for chills, diaphoresis, fatigue and fever  HENT: Negative  Negative for congestion, ear pain, hearing loss, postnasal drip, rhinorrhea, sinus pressure and sore throat  Eyes: Positive for photophobia  Negative for blurred vision, pain, discharge, redness, itching and visual disturbance  Respiratory: Negative  Negative for apnea, cough, chest tightness, shortness of breath and wheezing  Cardiovascular: Negative for chest pain, palpitations, leg swelling, syncope and near-syncope  Gastrointestinal: Positive for nausea and vomiting  Negative for abdominal pain and diarrhea  Endocrine: Negative  Genitourinary: Negative  Negative for flank pain, frequency and urgency  Musculoskeletal: Negative  Negative for back pain, myalgias, neck pain and neck stiffness  Skin: Negative  Allergic/Immunologic: Negative  Neurological: Negative  Negative for dizziness, tremors, focal weakness, seizures, syncope, facial asymmetry, speech difficulty, weakness, light-headedness, numbness, headaches, paresthesias and loss of balance  Hematological: Negative  All other systems reviewed and are negative  Physical Exam  Physical Exam  Vitals signs and nursing note reviewed  Constitutional:       General: She is awake  She is not in acute distress  Appearance: Normal appearance  She is well-developed and overweight  She is not ill-appearing, toxic-appearing or diaphoretic  HENT:      Head: Normocephalic and atraumatic  Right Ear: External ear normal       Left Ear: External ear normal       Nose: Nose normal       Mouth/Throat:      Pharynx: No oropharyngeal exudate     Eyes:      General: No scleral icterus  Right eye: No discharge  Left eye: No discharge  Extraocular Movements: Extraocular movements intact  Conjunctiva/sclera: Conjunctivae normal       Pupils: Pupils are equal, round, and reactive to light  Neck:      Musculoskeletal: Full passive range of motion without pain, normal range of motion and neck supple  No neck rigidity or muscular tenderness  Thyroid: No thyromegaly  Vascular: No JVD  Trachea: No tracheal deviation  Cardiovascular:      Rate and Rhythm: Normal rate and regular rhythm  Heart sounds: Normal heart sounds  No murmur  No friction rub  No gallop  Pulmonary:      Effort: Pulmonary effort is normal  No respiratory distress  Breath sounds: Normal breath sounds  No stridor  No wheezing, rhonchi or rales  Chest:      Chest wall: No tenderness  Abdominal:      General: Bowel sounds are normal  There is no distension  Palpations: Abdomen is soft  There is no mass  Tenderness: There is no abdominal tenderness  Hernia: No hernia is present  Lymphadenopathy:      Cervical: No cervical adenopathy  Skin:     General: Skin is warm and dry  Coloration: Skin is not jaundiced or pale  Findings: No bruising, erythema, lesion or rash  Neurological:      General: No focal deficit present  Mental Status: She is alert and oriented to person, place, and time  Cranial Nerves: No cranial nerve deficit  Motor: No weakness or abnormal muscle tone  Coordination: Coordination normal       Gait: Gait normal       Deep Tendon Reflexes: Reflexes are normal and symmetric  Reflexes normal    Psychiatric:         Mood and Affect: Mood normal          Behavior: Behavior is cooperative           Vital Signs  ED Triage Vitals   Temperature Pulse Respirations Blood Pressure SpO2   03/25/21 2105 03/25/21 2105 03/25/21 2105 03/25/21 2105 03/25/21 2105   98 3 °F (36 8 °C) 94 18 135/81 100 %      Temp src Heart Rate Source Patient Position - Orthostatic VS BP Location FiO2 (%)   -- 03/25/21 2304 03/25/21 2304 03/25/21 2304 --    Monitor Lying Right arm       Pain Score       03/25/21 2105       Worst Possible Pain           Vitals:    03/25/21 2105 03/25/21 2304   BP: 135/81 113/62   Pulse: 94 95   Patient Position - Orthostatic VS:  Lying         Visual Acuity      ED Medications  Medications   sodium chloride 0 9 % bolus 1,000 mL (0 mL Intravenous Stopped 3/25/21 2321)   ketorolac (TORADOL) injection 15 mg (15 mg Intravenous Given 3/25/21 2159)   metoclopramide (REGLAN) injection 10 mg (10 mg Intravenous Given 3/25/21 2157)   diphenhydrAMINE (BENADRYL) injection 12 5 mg (12 5 mg Intravenous Given 3/25/21 2155)       Diagnostic Studies  Results Reviewed     None                 No orders to display              Procedures  Procedures         ED Course  ED Course as of Mar 25 2348   Thu Mar 25, 2021   2253 Patient feeling much better  Stable for discharge                                SBIRT 20yo+      Most Recent Value   SBIRT (24 yo +)   In order to provide better care to our patients, we are screening all of our patients for alcohol and drug use  Would it be okay to ask you these screening questions? Yes Filed at: 03/25/2021 2326   Initial Alcohol Screen: US AUDIT-C    1  How often do you have a drink containing alcohol? 1 Filed at: 03/25/2021 2326   2  How many drinks containing alcohol do you have on a typical day you are drinking? 0 Filed at: 03/25/2021 2326   3a  Male UNDER 65: How often do you have five or more drinks on one occasion? 0 Filed at: 03/25/2021 2326   3b  FEMALE Any Age, or MALE 65+: How often do you have 4 or more drinks on one occassion? 0 Filed at: 03/25/2021 2326   Audit-C Score  1 Filed at: 03/25/2021 2326   ISAIAS: How many times in the past year have you    Used an illegal drug or used a prescription medication for non-medical reasons?   Never Filed at: 03/25/2021 2326 [facility]  Number of Diagnoses or Management Options  Diagnosis management comments: 68-year-old female presents with typical migraine headache  She states that started earlier today  It is associated with 4 episodes of vomiting  No fevers  On exam she is alert no acute distress  She does have some photophobia on exam   No meningeal signs  Neurologically she has no focal deficits  Will treat with migraine cocktail and reassess  Disposition  Final diagnoses:   Migraine headache     Time reflects when diagnosis was documented in both MDM as applicable and the Disposition within this note     Time User Action Codes Description Comment    3/25/2021 10:53 PM Charles GOODE Add [G43 909] Migraine headache       ED Disposition     ED Disposition Condition Date/Time Comment    Discharge Good Thu Mar 25, 2021 10:53 PM Charlotte Phillips discharge to home/self care  Follow-up Information     Follow up With Specialties Details Why Contact Info Additional [facility] Schedule an appointment as soon as possible for a visit in 1 week  [address], [address], [address], [address] [place], [place], [address]          Discharge Medication List as of 3/25/2021 10:54 PM      START taking these medications    Details   naproxen (NAPROSYN) 500 mg tablet Take 1 tablet (500 mg total) by mouth every 12 (twelve) hours as needed for mild pain, Starting Thu 3/25/2021, Print      ondansetron (ZOFRAN-ODT) 4 mg disintegrating tablet Take 1 tablet (4 mg total) by mouth every 8 (eight) hours as needed for nausea or vomiting, Starting Thu 3/25/2021, Print           No discharge procedures on file      PDMP Review     None          ED Provider  Electronically Signed by           John Celis DO  03/25/21 2435

## 2022-01-21 ENCOUNTER — TELEPHONE (OUTPATIENT)
Dept: ADMINISTRATIVE | Facility: OTHER | Age: 27
End: 2022-01-21

## 2022-01-21 NOTE — TELEPHONE ENCOUNTER
Upon review of the In Basket request we were able to locate, review, and update the patient chart as requested for Pap Smear (HPV) aka Cervical Cancer Screening  Any additional questions or concerns should be emailed to the Practice Liaisons via [email or web address] email, please do not reply via In Basket      Thank you  Pato Brennan MA

## 2022-01-21 NOTE — TELEPHONE ENCOUNTER
----- Message from Mery Smith RN sent at 1/21/2022  8:37 AM EST -----  Regarding: pap  01/21/22 8:37 AM    Hello, our patient Chanell Perez has had Pap Smear (HPV) aka Cervical Cancer Screening completed/performed  Please assist in updating the patient chart by pulling the Care Everywhere (CE) document  The date of service is 10/2020       Thank you,  Mery Smith RN   [address]  [phone]

## 2022-01-24 ENCOUNTER — OFFICE VISIT (OUTPATIENT)
Dept: FAMILY MEDICINE CLINIC | Facility: CLINIC | Age: 27
End: 2022-01-24
Payer: COMMERCIAL

## 2022-01-24 VITALS
BODY MASS INDEX: 29.62 KG/M2 | OXYGEN SATURATION: 99 % | HEART RATE: 91 BPM | HEIGHT: 65 IN | TEMPERATURE: 97.6 F | SYSTOLIC BLOOD PRESSURE: 112 MMHG | RESPIRATION RATE: 14 BRPM | WEIGHT: 177.8 LBS | DIASTOLIC BLOOD PRESSURE: 74 MMHG

## 2022-01-24 DIAGNOSIS — R53.83 OTHER FATIGUE: ICD-10-CM

## 2022-01-24 DIAGNOSIS — Z13.220 SCREENING CHOLESTEROL LEVEL: ICD-10-CM

## 2022-01-24 DIAGNOSIS — Z11.8 SCREENING FOR CHLAMYDIAL DISEASE: ICD-10-CM

## 2022-01-24 DIAGNOSIS — Z83.3 FAMILY HISTORY OF DIABETES MELLITUS (DM): ICD-10-CM

## 2022-01-24 DIAGNOSIS — N97.9 INFERTILITY, FEMALE: Primary | ICD-10-CM

## 2022-01-24 DIAGNOSIS — Z11.4 SCREENING FOR HIV (HUMAN IMMUNODEFICIENCY VIRUS): ICD-10-CM

## 2022-01-24 DIAGNOSIS — Z11.59 NEED FOR HEPATITIS C SCREENING TEST: ICD-10-CM

## 2022-01-24 PROBLEM — N83.201 CYST OF RIGHT OVARY: Status: RESOLVED | Noted: 2019-06-12 | Resolved: 2022-01-24

## 2022-01-24 PROBLEM — Z01.419 ENCOUNTER FOR ANNUAL ROUTINE GYNECOLOGICAL EXAMINATION: Status: RESOLVED | Noted: 2019-07-09 | Resolved: 2022-01-24

## 2022-01-24 PROCEDURE — 3008F BODY MASS INDEX DOCD: CPT | Performed by: PHYSICIAN ASSISTANT

## 2022-01-24 PROCEDURE — 3725F SCREEN DEPRESSION PERFORMED: CPT | Performed by: PHYSICIAN ASSISTANT

## 2022-01-24 PROCEDURE — 99203 OFFICE O/P NEW LOW 30 MIN: CPT | Performed by: PHYSICIAN ASSISTANT

## 2022-01-24 PROCEDURE — 1036F TOBACCO NON-USER: CPT | Performed by: PHYSICIAN ASSISTANT

## 2022-01-24 NOTE — PROGRESS NOTES
Assessment/Plan:    Infertility, female  Pt reports that she and her partner have been having unprotected sex for 1 year and she has not yet gotten pregnant  She was previously on OCP, stopped 1 year ago  Neither she nor her partner have had children before  Her periods are regular  She was diagnosed with L ovarian cyst last year, but it is no longer causing her pain and she isn't sure if it is still there or not  She is requesting a referral for a new gynecologist for fertility workup  Routine labs ordered and GYN referral given  Diagnoses and all orders for this visit:    Infertility, female  -     Ambulatory Referral to Obstetrics / Gynecology; Future    Need for hepatitis C screening test  -     Hepatitis C Antibody ([facility]); Future    Screening for HIV (human immunodeficiency virus)  -     HIV 1/2 Antigen/Antibody (4th Generation) w Reflex [facility]; Future    Screening cholesterol level  -     Lipid panel; Future    Screening for chlamydial disease  -     Chlamydia/GC amplified DNA by PCR; Future    Other fatigue  -     CBC and differential; Future    Family history of diabetes mellitus (DM)  -     Comprehensive metabolic panel; Future          Subjective:      Patient ID: José Miguel Cooper is a 27 y o  female  Melvin Proctor presents to the office today to establish care and to discuss desire for pregnancy  Pt is Dominican speaking only, audio  [id number] provided interpretive services throughout encounter  The following portions of the patient's history were reviewed and updated as appropriate: allergies, current medications, past family history, past medical history, past social history, past surgical history and problem list     Review of Systems   Constitutional: Negative for chills, fatigue and fever  HENT: Negative for congestion, rhinorrhea, sinus pressure, sinus pain, sneezing and sore throat  Respiratory: Negative for cough, shortness of breath and wheezing  Cardiovascular: Negative for chest pain, palpitations and leg swelling  Gastrointestinal: Negative for abdominal pain, constipation, diarrhea, nausea and vomiting  Genitourinary: Negative for dysuria, hematuria, menstrual problem and pelvic pain  Musculoskeletal: Negative for arthralgias, back pain, myalgias, neck pain and neck stiffness  Neurological: Negative for dizziness, seizures and headaches  Objective:      /74 (BP Location: Left arm, Patient Position: Sitting, Cuff Size: Standard)   Pulse 91   Temp 97 6 °F (36 4 °C) (Temporal)   Resp 14   Ht 5' 5" (1 651 m)   Wt 80 6 kg (177 lb 12 8 oz)   SpO2 99%   BMI 29 59 kg/m²          Physical Exam  Vitals reviewed  Constitutional:       General: She is not in acute distress  Appearance: Normal appearance  She is not toxic-appearing  HENT:      Head: Normocephalic and atraumatic  Eyes:      Extraocular Movements: Extraocular movements intact  Conjunctiva/sclera: Conjunctivae normal    Cardiovascular:      Rate and Rhythm: Normal rate and regular rhythm  Pulses: Normal pulses  Heart sounds: Normal heart sounds  No murmur heard  No friction rub  No gallop  Pulmonary:      Effort: Pulmonary effort is normal  No respiratory distress  Breath sounds: Normal breath sounds  No stridor  No wheezing, rhonchi or rales  Musculoskeletal:         General: Normal range of motion  Right lower leg: No edema  Left lower leg: No edema  Skin:     General: Skin is warm and dry  Neurological:      Mental Status: She is alert and oriented to person, place, and time  Psychiatric:         Mood and Affect: Mood normal          Behavior: Behavior normal          Thought Content: Thought content normal          BMI Counseling: Body mass index is 29 59 kg/m²   The BMI is above normal  Nutrition recommendations include 3-5 servings of fruits/vegetables daily, consuming healthier snacks and decreasing soda and/or juice intake

## 2022-01-24 NOTE — ASSESSMENT & PLAN NOTE
Pt reports that she and her partner have been having unprotected sex for 1 year and she has not yet gotten pregnant  She was previously on OCP, stopped 1 year ago  Neither she nor her partner have had children before  Her periods are regular  She was diagnosed with L ovarian cyst last year, but it is no longer causing her pain and she isn't sure if it is still there or not  She is requesting a referral for a new gynecologist for fertility workup  Routine labs ordered and GYN referral given

## 2022-01-25 ENCOUNTER — APPOINTMENT (OUTPATIENT)
Dept: LAB | Facility: HOSPITAL | Age: 27
End: 2022-01-25
Payer: COMMERCIAL

## 2022-01-25 DIAGNOSIS — Z11.8 SCREENING FOR CHLAMYDIAL DISEASE: ICD-10-CM

## 2022-01-25 DIAGNOSIS — Z11.4 SCREENING FOR HIV (HUMAN IMMUNODEFICIENCY VIRUS): ICD-10-CM

## 2022-01-25 DIAGNOSIS — Z83.3 FAMILY HISTORY OF DIABETES MELLITUS (DM): ICD-10-CM

## 2022-01-25 DIAGNOSIS — R53.83 OTHER FATIGUE: ICD-10-CM

## 2022-01-25 DIAGNOSIS — Z11.59 NEED FOR HEPATITIS C SCREENING TEST: ICD-10-CM

## 2022-01-25 DIAGNOSIS — Z13.220 SCREENING CHOLESTEROL LEVEL: ICD-10-CM

## 2022-01-25 LAB
ALBUMIN SERPL BCP-MCNC: 4.4 G/DL (ref 3–5.2)
ALP SERPL-CCNC: 58 U/L (ref 43–122)
ALT SERPL W P-5'-P-CCNC: 62 U/L
ANION GAP SERPL CALCULATED.3IONS-SCNC: 8 MMOL/L (ref 5–14)
AST SERPL W P-5'-P-CCNC: 41 U/L (ref 14–36)
BASOPHILS # BLD AUTO: 0.1 THOUSANDS/ΜL (ref 0–0.1)
BASOPHILS NFR BLD AUTO: 1 % (ref 0–1)
BILIRUB SERPL-MCNC: 0.27 MG/DL
BUN SERPL-MCNC: 10 MG/DL (ref 5–25)
CALCIUM SERPL-MCNC: 8.7 MG/DL (ref 8.4–10.2)
CHLORIDE SERPL-SCNC: 106 MMOL/L (ref 97–108)
CHOLEST SERPL-MCNC: 162 MG/DL
CO2 SERPL-SCNC: 25 MMOL/L (ref 22–30)
CREAT SERPL-MCNC: 0.57 MG/DL (ref 0.6–1.2)
EOSINOPHIL # BLD AUTO: 0.7 THOUSAND/ΜL (ref 0–0.4)
EOSINOPHIL NFR BLD AUTO: 12 % (ref 0–6)
ERYTHROCYTE [DISTWIDTH] IN BLOOD BY AUTOMATED COUNT: 14.1 %
GFR SERPL CREATININE-BSD FRML MDRD: 130 ML/MIN/1.73SQ M
GLUCOSE P FAST SERPL-MCNC: 90 MG/DL (ref 70–99)
HCT VFR BLD AUTO: 44.7 % (ref 36–46)
HCV AB SER QL: NORMAL
HDLC SERPL-MCNC: 46 MG/DL
HGB BLD-MCNC: 14.3 G/DL (ref 12–16)
LDLC SERPL CALC-MCNC: 107 MG/DL
LYMPHOCYTES # BLD AUTO: 1.6 THOUSANDS/ΜL (ref 0.5–4)
LYMPHOCYTES NFR BLD AUTO: 29 % (ref 25–45)
MCH RBC QN AUTO: 29.1 PG (ref 26–34)
MCHC RBC AUTO-ENTMCNC: 32 G/DL (ref 31–36)
MCV RBC AUTO: 91 FL (ref 80–100)
MONOCYTES # BLD AUTO: 0.5 THOUSAND/ΜL (ref 0.2–0.9)
MONOCYTES NFR BLD AUTO: 10 % (ref 1–10)
NEUTROPHILS # BLD AUTO: 2.6 THOUSANDS/ΜL (ref 1.8–7.8)
NEUTS SEG NFR BLD AUTO: 48 % (ref 45–65)
NONHDLC SERPL-MCNC: 116 MG/DL
PLATELET # BLD AUTO: 283 THOUSANDS/UL (ref 150–450)
PMV BLD AUTO: 8.2 FL (ref 8.9–12.7)
POTASSIUM SERPL-SCNC: 4.3 MMOL/L (ref 3.6–5)
PROT SERPL-MCNC: 7.6 G/DL (ref 5.9–8.4)
RBC # BLD AUTO: 4.92 MILLION/UL (ref 4–5.2)
SODIUM SERPL-SCNC: 139 MMOL/L (ref 137–147)
TRIGL SERPL-MCNC: 43 MG/DL
WBC # BLD AUTO: 5.4 THOUSAND/UL (ref 4.5–11)

## 2022-01-25 PROCEDURE — 80053 COMPREHEN METABOLIC PANEL: CPT

## 2022-01-25 PROCEDURE — 87389 HIV-1 AG W/HIV-1&-2 AB AG IA: CPT

## 2022-01-25 PROCEDURE — 36415 COLL VENOUS BLD VENIPUNCTURE: CPT

## 2022-01-25 PROCEDURE — 85025 COMPLETE CBC W/AUTO DIFF WBC: CPT

## 2022-01-25 PROCEDURE — 86803 HEPATITIS C AB TEST: CPT

## 2022-01-25 PROCEDURE — 80061 LIPID PANEL: CPT

## 2022-01-25 PROCEDURE — 87591 N.GONORRHOEAE DNA AMP PROB: CPT

## 2022-01-25 PROCEDURE — 87491 CHLMYD TRACH DNA AMP PROBE: CPT

## 2022-01-26 LAB — HIV 1+2 AB+HIV1 P24 AG SERPL QL IA: NORMAL

## 2022-01-27 ENCOUNTER — TELEPHONE (OUTPATIENT)
Dept: FAMILY MEDICINE CLINIC | Facility: CLINIC | Age: 27
End: 2022-01-27

## 2022-01-27 DIAGNOSIS — A74.9 CHLAMYDIA INFECTION: Primary | ICD-10-CM

## 2022-01-27 LAB
C TRACH DNA SPEC QL NAA+PROBE: POSITIVE
N GONORRHOEA DNA SPEC QL NAA+PROBE: NEGATIVE

## 2022-01-27 RX ORDER — DOXYCYCLINE HYCLATE 100 MG/1
100 CAPSULE ORAL EVERY 12 HOURS SCHEDULED
Qty: 14 CAPSULE | Refills: 0 | Status: SHIPPED | OUTPATIENT
Start: 2022-01-27 | End: 2022-02-03

## 2022-01-27 NOTE — TELEPHONE ENCOUNTER
PT called lvm to call back  prueba de clamidia positiva  Necesita leon doxiciclina 100 mg dos veces al día stephanie 7 días para tratarse  Abstenerse de [employer] sexuales stephanie el tratamiento; cualquier marianne también debe ser Starlene Lajas y tratada  Doxiciclina enviada a la farmacia y vuelva a realizar la prueba en un mes, esto también se ordenó

## 2022-01-27 NOTE — TELEPHONE ENCOUNTER
Called pt with assistance of Mirak[employer]  [phone] to go over lab results  No answer, voicemail not set up

## 2022-03-01 ENCOUNTER — ANNUAL EXAM (OUTPATIENT)
Dept: OBGYN CLINIC | Facility: CLINIC | Age: 27
End: 2022-03-01
Payer: COMMERCIAL

## 2022-03-01 VITALS
HEIGHT: 65 IN | BODY MASS INDEX: 29.99 KG/M2 | SYSTOLIC BLOOD PRESSURE: 110 MMHG | WEIGHT: 180 LBS | DIASTOLIC BLOOD PRESSURE: 70 MMHG

## 2022-03-01 DIAGNOSIS — Z30.09 FAMILY PLANNING COUNSELING: ICD-10-CM

## 2022-03-01 DIAGNOSIS — Z01.419 WOMEN'S ANNUAL ROUTINE GYNECOLOGICAL EXAMINATION: Primary | ICD-10-CM

## 2022-03-01 DIAGNOSIS — Z20.2 CHLAMYDIA CONTACT, UNTREATED: ICD-10-CM

## 2022-03-01 DIAGNOSIS — N97.9 INFERTILITY, FEMALE: ICD-10-CM

## 2022-03-01 PROCEDURE — 1036F TOBACCO NON-USER: CPT | Performed by: NURSE PRACTITIONER

## 2022-03-01 PROCEDURE — 99385 PREV VISIT NEW AGE 18-39: CPT | Performed by: NURSE PRACTITIONER

## 2022-03-01 PROCEDURE — 3008F BODY MASS INDEX DOCD: CPT | Performed by: NURSE PRACTITIONER

## 2022-03-01 RX ORDER — AZITHROMYCIN 500 MG/1
1000 TABLET, FILM COATED ORAL DAILY
Qty: 2 TABLET | Refills: 0 | Status: SHIPPED | OUTPATIENT
Start: 2022-03-01 | End: 2022-03-02

## 2022-03-01 NOTE — PROGRESS NOTES
Subjective    HPI:     Jessica Tobar is a 27 y o  nulliparous female accompanied by her fiance  Her menstrual cycles are regular and predictable - 28 days  Her current method of contraception includes none  She has been off birth control for over a year and they have not been preventing pregnancy  She is concerned that she has not become pregnant  She denies issues with intimacy  She denies /GI complaints  She complains of vaginal discomfort, irritation and foul odor for awhile, can not provide a specific time frame  In reviewing her chart she was evaluated by her PCP end of January, at which time STD screening was done and she was positive for Chlamydia  The pcp's office had made several attempts to contact the patient to inform her and to treat her with no success  She was made aware of this today, as well as her fiance  She feels safe at home  She denies depression/anxiety  Medical, surgical and family history reviewed  Her dental care is not done - years ago  She tries to eat a healthy diet and exercises  She is not happy with her weight  Gynecologic History    Patient's last menstrual period was 2022  Gardasil Vaccine Series: not completed  Last Pap: 10/12/2020  Results were: normal    Obstetric History    OB History    Para Term  AB Living   0 0 0 0 0 0   SAB IAB Ectopic Multiple Live Births   0 0 0 0 0       The following portions of the patient's history were reviewed and updated as appropriate: allergies, current medications, past family history, past medical history, past social history, past surgical history and problem list     Review of Systems    Pertinent items are noted in HPI  Objective    Physical Exam  Constitutional:       Appearance: Normal appearance  She is well-developed  Genitourinary:      Vulva, bladder and urethral meatus normal       No lesions in the vagina        Right Labia: No rash, tenderness, lesions, skin changes or Bartholin's cyst  Left Labia: No tenderness, lesions, skin changes, Bartholin's cyst or rash  No labial fusion noted  No inguinal adenopathy present in the right or left side  Vaginal discharge (yellowish malodorous discharge) present  No vaginal erythema, tenderness, bleeding or granulation tissue  No vaginal prolapse present  No vaginal atrophy present  Right Adnexa: not tender, not full and no mass present  Left Adnexa: not tender, not full and no mass present  Cervix is nulliparous  Cervical discharge (yellow discharge) present  No cervical motion tenderness, friability, lesion, polyp or nabothian cyst       Uterus is not enlarged, tender, irregular or prolapsed  No uterine mass detected  Uterus is anteverted  Pelvic exam was performed with patient in the lithotomy position  Breasts: Breasts are symmetrical       Right: No inverted nipple, mass, nipple discharge, skin change, tenderness, axillary adenopathy or supraclavicular adenopathy  Left: No inverted nipple, mass, nipple discharge, skin change, tenderness, axillary adenopathy or supraclavicular adenopathy  HENT:      Head: Normocephalic and atraumatic  Neck:      Thyroid: No thyromegaly  Cardiovascular:      Rate and Rhythm: Normal rate and regular rhythm  Heart sounds: Normal heart sounds, S1 normal and S2 normal    Pulmonary:      Effort: Pulmonary effort is normal       Breath sounds: Normal breath sounds  Abdominal:      General: Bowel sounds are normal  There is no distension  Palpations: Abdomen is soft  There is no mass  Tenderness: There is no abdominal tenderness  There is no guarding  Hernia: There is no hernia in the left inguinal area or right inguinal area  Musculoskeletal:      Cervical back: Neck supple  Lymphadenopathy:      Cervical: No cervical adenopathy  Upper Body:      Right upper body: No supraclavicular or axillary adenopathy        Left upper body: No supraclavicular or axillary adenopathy  Lower Body: No right inguinal adenopathy  No left inguinal adenopathy  Neurological:      Mental Status: She is alert  Skin:     General: Skin is warm and dry  Findings: No rash  Psychiatric:         Attention and Perception: Attention and perception normal          Mood and Affect: Mood and affect normal          Speech: Speech normal          Behavior: Behavior is cooperative  Thought Content: Thought content normal          Cognition and Memory: Cognition and memory normal          Judgment: Judgment normal    Vitals and nursing note reviewed  Assessment and Plan    Melvin Proctor was seen today for gynecologic exam     Diagnoses and all orders for this visit:    Women's annual routine gynecological examination    Family planning counseling    Chlamydia contact, untreated  -     azithromycin (ZITHROMAX) 500 MG tablet; Take 2 tablets (1,000 mg total) by mouth daily for 1 dose      Patient informed of GYN exam findings  A pap smear was not performed  I have discussed the importance of exercise and healthy diet as well as adequate intake of calcium and vitamin D  The current ASCCP guidelines were reviewed  The low risk patient will receive pap smear screening every 3 years until the age of 34 and then every 3 to 5 years with HPV co-testing from the ages of 33-67  I emphasized the importance of an annual pelvic and breast exam  A yearly mammogram is recommended for breast cancer screening starting at age 36  Education on evaluating for ovulation provided  Chlamydia infection education provided  Treatment sent to pharmacy  Her fiance was provided with the information for [facility] on [address] in [place] so he can get treated  They are aware that they need to abstain from intimacy until both are adequately treated and there is a DARVIN      Results will be released to [facility], if abnormal will call to review and discuss treatment plan  All questions have been answered to her satisfaction  Follow up in: 6 weeks for DARVIN

## 2022-03-08 LAB
C TRACH RRNA SPEC QL NAA+PROBE: DETECTED
CLINICAL INFO: ABNORMAL
CYTO CVX: ABNORMAL
CYTOLOGY CMNT CVX/VAG CYTO-IMP: ABNORMAL
DATE PREVIOUS BX: ABNORMAL
GEN CATEG CVX/VAG CYTO-IMP: ABNORMAL
HPV E6+E7 MRNA CVX QL NAA+PROBE: NOT DETECTED
LMP START DATE: ABNORMAL
N GONORRHOEA RRNA SPEC QL NAA+PROBE: NOT DETECTED
PATHOLOGIST CVX/VAG CYTO: ABNORMAL
SL AMB PREV. PAP:: ABNORMAL
SPECIMEN SOURCE CVX/VAG CYTO: ABNORMAL

## 2022-03-09 ENCOUNTER — TELEPHONE (OUTPATIENT)
Dept: OBGYN CLINIC | Facility: CLINIC | Age: 27
End: 2022-03-09

## 2022-03-16 ENCOUNTER — HOSPITAL ENCOUNTER (EMERGENCY)
Facility: HOSPITAL | Age: 27
Discharge: HOME | End: 2022-03-16
Attending: EMERGENCY MEDICINE | Admitting: EMERGENCY MEDICINE
Payer: COMMERCIAL

## 2022-03-16 VITALS
WEIGHT: 179.45 LBS | RESPIRATION RATE: 18 BRPM | HEART RATE: 116 BPM | BODY MASS INDEX: 29.9 KG/M2 | OXYGEN SATURATION: 100 % | TEMPERATURE: 98.3 F | DIASTOLIC BLOOD PRESSURE: 72 MMHG | SYSTOLIC BLOOD PRESSURE: 120 MMHG | HEIGHT: 65 IN

## 2022-03-16 DIAGNOSIS — M79.10 MYALGIA: Primary | ICD-10-CM

## 2022-03-16 DIAGNOSIS — B34.9 VIRAL SYNDROME: ICD-10-CM

## 2022-03-16 LAB
BACTERIA UR QL AUTO: ABNORMAL /HPF
BILIRUB UR QL STRIP: NEGATIVE
CLARITY UR: CLEAR
COLOR UR: YELLOW
EXT PREG TEST URINE: NEGATIVE
EXT. CONTROL ED NAV: NORMAL
GLUCOSE UR STRIP-MCNC: NEGATIVE MG/DL
HGB UR QL STRIP.AUTO: ABNORMAL
KETONES UR STRIP-MCNC: NEGATIVE MG/DL
LEUKOCYTE ESTERASE UR QL STRIP: NEGATIVE
NITRITE UR QL STRIP: NEGATIVE
NON-SQ EPI CELLS URNS QL MICRO: ABNORMAL /HPF
PH UR STRIP.AUTO: 8.5 [PH] (ref 4.5–8)
PROT UR STRIP-MCNC: NEGATIVE MG/DL
RBC #/AREA URNS AUTO: ABNORMAL /HPF
S PYO DNA THROAT QL NAA+PROBE: NOT DETECTED
SARS-COV-2 AG UPPER RESP QL IA: NORMAL
SP GR UR STRIP.AUTO: 1.02 (ref 1–1.03)
UROBILINOGEN UR QL STRIP.AUTO: 1 E.U./DL
WBC #/AREA URNS AUTO: ABNORMAL /HPF

## 2022-03-16 PROCEDURE — 81025 URINE PREGNANCY TEST: CPT | Performed by: EMERGENCY MEDICINE

## 2022-03-16 PROCEDURE — 87651 STREP A DNA AMP PROBE: CPT | Performed by: EMERGENCY MEDICINE

## 2022-03-16 PROCEDURE — 99283 EMERGENCY DEPT VISIT LOW MDM: CPT

## 2022-03-16 PROCEDURE — 99284 EMERGENCY DEPT VISIT MOD MDM: CPT | Performed by: EMERGENCY MEDICINE

## 2022-03-16 PROCEDURE — 87636 SARSCOV2 & INF A&B AMP PRB: CPT | Performed by: EMERGENCY MEDICINE

## 2022-03-16 PROCEDURE — 81001 URINALYSIS AUTO W/SCOPE: CPT

## 2022-03-16 RX ORDER — IBUPROFEN 600 MG/1
600 TABLET ORAL ONCE
Status: COMPLETED | OUTPATIENT
Start: 2022-03-16 | End: 2022-03-16

## 2022-03-16 RX ADMIN — IBUPROFEN 600 MG: 600 TABLET ORAL at 20:32

## 2022-03-16 NOTE — Clinical Note
Peña Sabrina was seen and treated in our emergency department on 3/16/2022  Diagnosis:     Marco Antonio Chowdary  may return to work on return date  She may return on this date: 03/18/2022         If you have any questions or concerns, please don't hesitate to call        Jasbir Ravi MD    ______________________________           _______________          _______________  Hospital Representative                              Date                                Time

## 2022-03-17 LAB
FLUAV RNA RESP QL NAA+PROBE: NEGATIVE
FLUBV RNA RESP QL NAA+PROBE: NEGATIVE
SARS-COV-2 RNA RESP QL NAA+PROBE: NEGATIVE

## 2022-03-17 NOTE — ED PROVIDER NOTES
History  Chief Complaint   Patient presents with    Generalized Body Aches     generalized body aches, weakness, and fever since yesterday  tylenol taken around 2pm  denies n/v/d, denies couigh     26 y/o female with body aches, sore throat and fever since yest   No n/v/d  No cough, pt 's heart rate is 126 upon arrival to the ER  None       Past Medical History:   Diagnosis Date    Abnormal Pap smear of cervix     Asthma     Chlamydia 2019,2022       History reviewed  No pertinent surgical history  Family History   Problem Relation Age of Onset    Cancer Maternal Grandfather     Heart disease Maternal Grandfather     Heart attack Paternal Grandmother     Leukemia Paternal Grandfather      I have reviewed and agree with the history as documented  E-Cigarette/Vaping    E-Cigarette Use Never User      E-Cigarette/Vaping Substances    Nicotine No     THC No     CBD No     Flavoring No     Other No     Unknown No      Social History     Tobacco Use    Smoking status: Never Smoker    Smokeless tobacco: Never Used   Vaping Use    Vaping Use: Never used   Substance Use Topics    Alcohol use: Yes     Comment: social    Drug use: No       Review of Systems   Constitutional: Positive for fever  Negative for appetite change  HENT: Positive for sore throat  Negative for rhinorrhea  Eyes: Negative for pain  Respiratory: Negative for cough, shortness of breath and wheezing  Cardiovascular: Negative for chest pain and leg swelling  Gastrointestinal: Negative for abdominal pain, diarrhea and vomiting  Genitourinary: Negative for dysuria and flank pain  Musculoskeletal: Positive for myalgias  Negative for back pain and neck pain  Skin: Negative for rash  Neurological: Negative for syncope and headaches  Psychiatric/Behavioral:        Mood normal       Physical Exam  Physical Exam  Vitals and nursing note reviewed  Constitutional:       Appearance: She is well-developed  HENT:      Head: Normocephalic and atraumatic  Right Ear: External ear normal       Left Ear: External ear normal       Mouth/Throat:      Mouth: Mucous membranes are moist       Pharynx: No oropharyngeal exudate  Comments: Mild erythema in oorpharynx  Eyes:      General: No scleral icterus  Extraocular Movements: Extraocular movements intact  Cardiovascular:      Rate and Rhythm: Regular rhythm  Tachycardia present  Pulmonary:      Effort: Pulmonary effort is normal  No respiratory distress  Breath sounds: Normal breath sounds  Abdominal:      Palpations: Abdomen is soft  Tenderness: There is no abdominal tenderness  Musculoskeletal:         General: No deformity or signs of injury  Normal range of motion  Cervical back: Normal range of motion and neck supple  Skin:     General: Skin is warm and dry  Coloration: Skin is not jaundiced or pale  Neurological:      General: No focal deficit present  Mental Status: She is alert and oriented to person, place, and time     Psychiatric:         Mood and Affect: Mood normal          Behavior: Behavior normal          Vital Signs  ED Triage Vitals   Temperature Pulse Respirations Blood Pressure SpO2   03/16/22 1925 03/16/22 1925 03/16/22 1925 03/16/22 1925 03/16/22 1925   98 3 °F (36 8 °C) (!) 125 18 125/65 100 %      Temp Source Heart Rate Source Patient Position - Orthostatic VS BP Location FiO2 (%)   03/16/22 1925 03/16/22 1925 03/16/22 1925 03/16/22 1925 --   Oral Monitor Sitting Right arm       Pain Score       03/16/22 2032       9           Vitals:    03/16/22 1925 03/16/22 2058 03/16/22 2134   BP: 125/65 124/74 120/72   Pulse: (!) 125 (!) 126 (!) 116   Patient Position - Orthostatic VS: Sitting  Lying         Visual Acuity      ED Medications  Medications   ibuprofen (MOTRIN) tablet 600 mg (600 mg Oral Given 3/16/22 2032)       Diagnostic Studies  Results Reviewed     Procedure Component Value Units Date/Time Urine Microscopic [[id number]]  (Abnormal) Collected: 03/16/22 2053    Lab Status: Final result Specimen: Urine, Clean Catch Updated: 03/16/22 2151     RBC, UA 0-1 /hpf      WBC, UA 1-2 /hpf      Epithelial Cells Occasional /hpf      Bacteria, UA Innumerable /hpf     Strep A PCR [[id number]]  (Normal) Collected: 03/16/22 2041    Lab Status: Final result Specimen: Throat Updated: 03/16/22 2114     STREP A PCR Not Detected    COVID Rapid Antigen [[id number]]  (Normal) Collected: 03/16/22 2041    Lab Status: Final result Specimen: Nares from Nose Updated: 03/16/22 2103     SARS-CoV-2 Ag Negative Presumptive    Covid19 and INFLUENZA A/B PCR [[id number]] Collected: 03/16/22 2041    Lab Status: In process Specimen: Nares from Nose Updated: 03/16/22 2103    POCT pregnancy, urine [[id number]]  (Normal) Resulted: 03/16/22 2055    Lab Status: Final result Updated: 03/16/22 2055     EXT PREG TEST UR (Ref: Negative) negative     Control valid    Urine Macroscopic, POC [[id number]]  (Abnormal) Collected: 03/16/22 2053    Lab Status: Final result Specimen: Urine Updated: 03/16/22 2054     Color, UA Yellow     Clarity, UA Clear     pH, UA 8 5     Leukocytes, UA Negative     Nitrite, UA Negative     Protein, UA Negative mg/dl      Glucose, UA Negative mg/dl      Ketones, UA Negative mg/dl      Urobilinogen, UA 1 0 E U /dl      Bilirubin, UA Negative     Blood, UA Trace     Specific Rew, UA 1 020    Narrative:      CLINITEK RESULT                 No orders to display              Procedures  Procedures         ED Course                               SBIRT 20yo+      Most Recent Value   SBIRT (24 yo +)    In order to provide better care to our patients, we are screening all of our patients for alcohol and drug use  Would it be okay to ask you these screening questions? Yes Filed at: 03/16/2022 2058   Initial Alcohol Screen: US AUDIT-C     1  How often do you have a drink containing alcohol? 0 Filed at: 03/16/2022 2058   2   How many drinks containing alcohol do you have on a typical day you are drinking? 0 Filed at: 03/16/2022 2058   3a  Male UNDER 65: How often do you have five or more drinks on one occasion? 0 Filed at: 03/16/2022 2058   3b  FEMALE Any Age, or MALE 65+: How often do you have 4 or more drinks on one occassion? 0 Filed at: 03/16/2022 2058   Audit-C Score 0 Filed at: 03/16/2022 2058   ISAIAS: How many times in the past year have you    Used an illegal drug or used a prescription medication for non-medical reasons? Never Filed at: 03/16/2022 2058                    MDM  Number of Diagnoses or Management Options     Amount and/or Complexity of Data Reviewed  Clinical lab tests: ordered and reviewed    Risk of Complications, Morbidity, and/or Mortality  Presenting problems: moderate  General comments: Pt  [place] better in ER, I told her that her covid and strep test were neg  She is stable for outpt  Follow up  After drinking some water , her heart rate is 110's        Disposition  Final diagnoses:   Myalgia   Viral syndrome     Time reflects when diagnosis was documented in both MDM as applicable and the Disposition within this note     Time User Action Codes Description Comment    3/16/2022  9:12 PM Lanis Laughter Add [P02 35] Myalgia     3/16/2022  9:12 PM Lanis Laughter R Add [B34 9] Viral syndrome       ED Disposition     ED Disposition Condition Date/Time Comment    Discharge Stable Wed Mar 16, 2022  9:12 PM Charlotte Phillips discharge to home/self care  Follow-up Information     Follow up With Specialties Details Why Contact Info    WILBERTO Rowan Nurse Practitioner   [address] [place] [address] 305  [address] [place], [place] [phone]            There are no discharge medications for this patient  No discharge procedures on file      PDMP Review     None          ED Provider  Electronically Signed by           Gary Soria MD  03/16/22 1417

## 2022-04-18 ENCOUNTER — OFFICE VISIT (OUTPATIENT)
Dept: OBGYN CLINIC | Facility: CLINIC | Age: 27
End: 2022-04-18
Payer: COMMERCIAL

## 2022-04-18 VITALS
SYSTOLIC BLOOD PRESSURE: 130 MMHG | DIASTOLIC BLOOD PRESSURE: 86 MMHG | WEIGHT: 182 LBS | HEIGHT: 65 IN | BODY MASS INDEX: 30.32 KG/M2

## 2022-04-18 DIAGNOSIS — N83.201 RIGHT OVARIAN CYST: ICD-10-CM

## 2022-04-18 DIAGNOSIS — R10.2 PELVIC PAIN: ICD-10-CM

## 2022-04-18 DIAGNOSIS — Z20.2 CHLAMYDIA CONTACT, TREATED: Primary | ICD-10-CM

## 2022-04-18 PROCEDURE — 3008F BODY MASS INDEX DOCD: CPT | Performed by: NURSE PRACTITIONER

## 2022-04-18 PROCEDURE — 99213 OFFICE O/P EST LOW 20 MIN: CPT | Performed by: NURSE PRACTITIONER

## 2022-04-18 PROCEDURE — 1036F TOBACCO NON-USER: CPT | Performed by: NURSE PRACTITIONER

## 2022-04-18 NOTE — PROGRESS NOTES
Charlotte LoeraBelén 51-year-old female  She presents for [facility] for Chlamydia  She tested positive on 3/1/22 and was treated  Her partner was tested and results were negative  She denies knowledge of risky exposure  Previous history of STD:  chlamydia  Current symptoms include none  Contraception: none  She also complains of left side pelvic pain  She has a history of ovarian cyst in 2019 for which she was on OCP for in the past      Patient's last menstrual period was 04/10/2022  ROS:  As indicated in HPI  All other ROS negative  Physical Exam  Constitutional:       Appearance: Normal appearance  Genitourinary:      No lesions in the vagina  Right Labia: No rash, tenderness, lesions, skin changes or Bartholin's cyst      Left Labia: No tenderness, lesions, skin changes, Bartholin's cyst or rash  No labial fusion noted  No vaginal discharge, erythema, tenderness, bleeding or granulation tissue  No vaginal prolapse present  No vaginal atrophy present  Right Adnexa: not tender, not full and no mass present  Left Adnexa: not tender, not full and no mass present  Cervix is nulliparous  No cervical discharge, friability, lesion or nabothian cyst       Uterus is not enlarged or tender  Uterus is retroverted  Pelvic exam was performed with patient in the lithotomy position  HENT:      Head: Normocephalic and atraumatic  Musculoskeletal:      Cervical back: Neck supple  Neurological:      Mental Status: She is alert  Skin:     General: Skin is warm  Psychiatric:         Behavior: Behavior is cooperative  Vitals and nursing note reviewed  Amanda Burnham was seen today for exposure to std  Diagnoses and all orders for this visit:    Chlamydia contact, treated    Right ovarian cyst  -     US pelvis complete w transvaginal; Future    Pelvic pain  -     US pelvis complete w transvaginal; Future      Culture obtained for DAVRIN   Repeat US ordered for evaluation of left side pelvic pain to r/o ovarian cyst    Results will be released to [facility] [place], if abnormal will call to review and discuss treatment plan

## 2022-04-23 LAB
A VAGINAE DNA VAG NAA+PROBE-LOG#: DETECTED LOG CELLS/ML
C GLABRATA DNA VAG QL NAA+PROBE: NOT DETECTED
C TRACH RRNA SPEC QL NAA+PROBE: NOT DETECTED
CANDIDA DNA VAG QL NAA+PROBE: DETECTED
G VAGINALIS DNA VAG NAA+PROBE-LOG#: 7 LOG (CELLS/ML)
LACTOBACILLUS DNA VAG NAA+PROBE-LOG#: 6.4 LOG (CELLS/ML)
MEGASPHAERA SP DNA VAG NAA+PROBE-LOG#: NOT DETECTED LOG CELLS/ML
N GONORRHOEA RRNA SPEC QL NAA+PROBE: NOT DETECTED
SL AMB BV CATEGORY:: ABNORMAL
SL AMB C. PARAPSILOSIS, DNA: NOT DETECTED
SL AMB C. TROPICALIS, DNA: NOT DETECTED
T VAGINALIS RRNA SPEC QL NAA+PROBE: NOT DETECTED

## 2022-04-25 ENCOUNTER — TELEPHONE (OUTPATIENT)
Dept: OBGYN CLINIC | Facility: CLINIC | Age: 27
End: 2022-04-25

## 2022-04-25 DIAGNOSIS — B96.89 BV (BACTERIAL VAGINOSIS): Primary | ICD-10-CM

## 2022-04-25 DIAGNOSIS — N76.0 BV (BACTERIAL VAGINOSIS): Primary | ICD-10-CM

## 2022-04-25 DIAGNOSIS — B37.3 CANDIDA VAGINITIS: ICD-10-CM

## 2022-04-25 RX ORDER — METRONIDAZOLE 500 MG/1
500 TABLET ORAL EVERY 12 HOURS SCHEDULED
Qty: 14 TABLET | Refills: 0 | Status: SHIPPED | OUTPATIENT
Start: 2022-04-25 | End: 2022-05-02

## 2022-04-25 RX ORDER — FLUCONAZOLE 100 MG/1
100 TABLET ORAL DAILY
Qty: 2 TABLET | Refills: 0 | Status: SHIPPED | OUTPATIENT
Start: 2022-04-25 | End: 2022-04-27

## 2022-04-25 NOTE — TELEPHONE ENCOUNTER
----- Message from Cady Cage RN sent at 4/25/2022 10:18 AM EDT -----  Regarding: FW: Don Art     ----- Message -----  From: Barbra Verde  Sent: 4/25/2022  10:17 AM EDT  To: [facility]  Subject: Glenroy Raelouis white escribiendo porque salieron los [employer] clamidia salió negativa, katrina salió otra positivo que fue Albicans DNA, me gustaría saber que riri hacer

## 2022-04-26 ENCOUNTER — HOSPITAL ENCOUNTER (OUTPATIENT)
Dept: ULTRASOUND IMAGING | Facility: HOSPITAL | Age: 27
Discharge: HOME | End: 2022-04-26
Payer: COMMERCIAL

## 2022-04-26 DIAGNOSIS — N83.201 RIGHT OVARIAN CYST: ICD-10-CM

## 2022-04-26 DIAGNOSIS — R10.2 PELVIC PAIN: ICD-10-CM

## 2022-04-26 PROCEDURE — 76830 TRANSVAGINAL US NON-OB: CPT

## 2022-04-26 PROCEDURE — 76856 US EXAM PELVIC COMPLETE: CPT

## 2022-05-02 DIAGNOSIS — N83.201 BILATERAL OVARIAN CYSTS: Primary | ICD-10-CM

## 2022-05-02 DIAGNOSIS — N83.202 BILATERAL OVARIAN CYSTS: Primary | ICD-10-CM

## 2023-03-08 NOTE — TELEPHONE ENCOUNTER
----- Message from Tone Shore PA-C sent at 1/27/2022  9:30 AM EST -----  Please call pt regarding positive chlamydia test  She needs to take doxycycline 100mg BID for 7 days to treat  Abstain from sex while under treatment; any partners should also be tested and treated  Doxycycline sent to pharmacy and please re-test in one month, this has also been ordered       (I tried to call pt and no answer and no voicemail set up - if you get ahold of her and she has questions I can call her back) Quality 110: Preventive Care And Screening: Influenza Immunization: Influenza Immunization previously received during influenza season Detail Level: Detailed

## 2024-10-27 NOTE — PROGRESS NOTES
"Viability Appt  10/28/24  12:58 PM  : [id number]       S: 29 y.o.  who presents for viability scan with LMP of 24. She is 9 weeks and 0 days by her LMP. She reports nausea. She denies cramping or vaginal bleeding, but did have left sided pelvic pain which self-resolved 3 days ago This is a planned and welcomed pregnancy. Her previous pregnancy was a surgical elective termination in  at [facility].     Past Medical History:   Diagnosis Date    Abnormal Pap smear of cervix      & : ASCUS    Asthma     Chlamydia ,       OB History    Para Term  AB Living   2 0 0 0 1 0   SAB IAB Ectopic Multiple Live Births   0 1 0 0 0      # Outcome Date GA Lbr Jerald/2nd Weight Sex Type Anes PTL Lv   2 Current            1 IAB               Obstetric Comments    - pregnancy termination        O:  Vitals:    10/28/24 1119   BP: 116/57   BP Location: Right arm   Pulse: 67   Weight: 68.9 kg (152 lb)   Height: 5' 5\" (1.651 m)       TVUS: viable, mathews IUP at 9 weeks 2 days with CRL 2.52cm.  bpm. KAY 25. Final dating via LMP consistent with TVUS.                        A/P:  #1. IUP at 9 weeks and 0 days  - Viable pregnancy on TVUS  - Prenatal labs ordered  - MFM referral placed  - Genetic labs ordered  - RTC in 2 week for nurse intake visit    Problem List Items Addressed This Visit    None  Visit Diagnoses       Amenorrhea    -  Primary    Relevant Medications    pyridoxine (VITAMIN B6) 50 mg tablet    doxylamine (UNISOM) 25 MG tablet    Other Relevant Orders    PRENATAL CARRIER PANEL (CFVANTAGE, FRAGILE X, SMA)    HIV 1/2 AG/AB w Reflex [facility] for 2 yr old and above    Hepatitis C antibody    UA (URINE) with reflex to Scope    Urine culture    Hepatitis B surface antigen    CBC and differential    Rubella antibody, IgG    Type and screen    RPR-Syphilis Screening (Total Syphilis IGG/IGM)    Hepatitis B surface antibody    Anemia Panel w/Reflex, OB    " Ambulatory Referral to Maternal Fetal Medicine    [facility] OB < 14 weeks single or first gestation level 1            June Ortiz MD  OB/GYN PGY-4  10/28/2024  11:44 AM

## 2024-10-28 ENCOUNTER — ULTRASOUND (OUTPATIENT)
Dept: OBGYN CLINIC | Facility: CLINIC | Age: 29
End: 2024-10-28

## 2024-10-28 VITALS
WEIGHT: 152 LBS | HEART RATE: 67 BPM | BODY MASS INDEX: 25.33 KG/M2 | SYSTOLIC BLOOD PRESSURE: 116 MMHG | DIASTOLIC BLOOD PRESSURE: 57 MMHG | HEIGHT: 65 IN

## 2024-10-28 DIAGNOSIS — N91.2 AMENORRHEA: Primary | ICD-10-CM

## 2024-10-28 RX ORDER — LANOLIN ALCOHOL/MO/W.PET/CERES
50 CREAM (GRAM) TOPICAL DAILY
Qty: 30 TABLET | Refills: 3 | Status: SHIPPED | OUTPATIENT
Start: 2024-10-28 | End: 2025-02-25

## 2024-10-28 NOTE — LETTER
Work Letter    [facility]  1995  [address]  [facility] [address]    Your employee is a patient at the [facility].    We recommend that all pregnant women:    1. Have a well-ventilated workspace.  2. Wear low-heeled shoes.  3. Work no more than 40 hours per week.  4. Have a 15 minute break every 2 hours and at least 30 minutes for a meal break.   5. Use good body mechanics by bending at your knees to avoid back strain and lift no more than 20 pounds without assistance.  6. Have ready access to bathrooms and water.    She may continue to work until her due date unless medical complications arise. We anticipate she may return to work in 6-8 weeks after delivery.     Sincerely,    [facility]

## 2024-10-31 ENCOUNTER — PATIENT OUTREACH (OUTPATIENT)
Dept: OBGYN CLINIC | Facility: CLINIC | Age: 29
End: 2024-10-31

## 2024-10-31 NOTE — PROGRESS NOTES
SHANI GUARDADO received a message from [employer] that pt would like to apply for MA as secondary insurance, SHANI GUARDADO sent a message to FC office to see if they can assist.

## 2024-11-05 ENCOUNTER — PATIENT OUTREACH (OUTPATIENT)
Dept: OBGYN CLINIC | Facility: CLINIC | Age: 29
End: 2024-11-05

## 2024-11-05 ENCOUNTER — APPOINTMENT (OUTPATIENT)
Dept: LAB | Facility: CLINIC | Age: 29
End: 2024-11-05
Payer: COMMERCIAL

## 2024-11-05 ENCOUNTER — INITIAL PRENATAL (OUTPATIENT)
Dept: OBGYN CLINIC | Facility: CLINIC | Age: 29
End: 2024-11-05

## 2024-11-05 DIAGNOSIS — Z34.91 PRENATAL CARE IN FIRST TRIMESTER: Primary | ICD-10-CM

## 2024-11-05 DIAGNOSIS — N91.2 AMENORRHEA: ICD-10-CM

## 2024-11-05 LAB
ABO GROUP BLD: NORMAL
BASOPHILS # BLD AUTO: 0.05 THOUSANDS/ΜL (ref 0–0.1)
BASOPHILS NFR BLD AUTO: 1 % (ref 0–1)
BILIRUB UR QL STRIP: NEGATIVE
BLD GP AB SCN SERPL QL: NEGATIVE
CLARITY UR: CLEAR
COLOR UR: YELLOW
EOSINOPHIL # BLD AUTO: 0.58 THOUSAND/ΜL (ref 0–0.61)
EOSINOPHIL NFR BLD AUTO: 6 % (ref 0–6)
ERYTHROCYTE [DISTWIDTH] IN BLOOD BY AUTOMATED COUNT: 14.3 % (ref 11.6–15.1)
GLUCOSE UR STRIP-MCNC: NEGATIVE MG/DL
HCT VFR BLD AUTO: 42.5 % (ref 34.8–46.1)
HGB BLD-MCNC: 14 G/DL (ref 11.5–15.4)
HGB UR QL STRIP.AUTO: NEGATIVE
IMM GRANULOCYTES # BLD AUTO: 0.05 THOUSAND/UL (ref 0–0.2)
IMM GRANULOCYTES NFR BLD AUTO: 1 % (ref 0–2)
KETONES UR STRIP-MCNC: NEGATIVE MG/DL
LEUKOCYTE ESTERASE UR QL STRIP: NEGATIVE
LYMPHOCYTES # BLD AUTO: 1.69 THOUSANDS/ΜL (ref 0.6–4.47)
LYMPHOCYTES NFR BLD AUTO: 18 % (ref 14–44)
MCH RBC QN AUTO: 30.6 PG (ref 26.8–34.3)
MCHC RBC AUTO-ENTMCNC: 32.9 G/DL (ref 31.4–37.4)
MCV RBC AUTO: 93 FL (ref 82–98)
MONOCYTES # BLD AUTO: 0.69 THOUSAND/ΜL (ref 0.17–1.22)
MONOCYTES NFR BLD AUTO: 7 % (ref 4–12)
NEUTROPHILS # BLD AUTO: 6.26 THOUSANDS/ΜL (ref 1.85–7.62)
NEUTS SEG NFR BLD AUTO: 67 % (ref 43–75)
NITRITE UR QL STRIP: NEGATIVE
NRBC BLD AUTO-RTO: 0 /100 WBCS
PH UR STRIP.AUTO: 6 [PH]
PLATELET # BLD AUTO: 295 THOUSANDS/UL (ref 149–390)
PMV BLD AUTO: 9.7 FL (ref 8.9–12.7)
PROT UR STRIP-MCNC: NEGATIVE MG/DL
RBC # BLD AUTO: 4.57 MILLION/UL (ref 3.81–5.12)
RH BLD: POSITIVE
RUBV IGG SERPL IA-ACNC: 79.7 IU/ML
SP GR UR STRIP.AUTO: 1.02 (ref 1–1.03)
SPECIMEN EXPIRATION DATE: NORMAL
UROBILINOGEN UR STRIP-ACNC: <2 MG/DL
WBC # BLD AUTO: 9.32 THOUSAND/UL (ref 4.31–10.16)

## 2024-11-05 PROCEDURE — 86803 HEPATITIS C AB TEST: CPT

## 2024-11-05 PROCEDURE — 87389 HIV-1 AG W/HIV-1&-2 AB AG IA: CPT

## 2024-11-05 PROCEDURE — 86901 BLOOD TYPING SEROLOGIC RH(D): CPT

## 2024-11-05 PROCEDURE — 86780 TREPONEMA PALLIDUM: CPT

## 2024-11-05 PROCEDURE — 81003 URINALYSIS AUTO W/O SCOPE: CPT

## 2024-11-05 PROCEDURE — 85025 COMPLETE CBC W/AUTO DIFF WBC: CPT

## 2024-11-05 PROCEDURE — 87086 URINE CULTURE/COLONY COUNT: CPT

## 2024-11-05 PROCEDURE — 86850 RBC ANTIBODY SCREEN: CPT

## 2024-11-05 PROCEDURE — 36415 COLL VENOUS BLD VENIPUNCTURE: CPT

## 2024-11-05 PROCEDURE — 86900 BLOOD TYPING SEROLOGIC ABO: CPT

## 2024-11-05 PROCEDURE — 86706 HEP B SURFACE ANTIBODY: CPT

## 2024-11-05 PROCEDURE — 87340 HEPATITIS B SURFACE AG IA: CPT

## 2024-11-05 PROCEDURE — 99211 OFF/OP EST MAY X REQ PHY/QHP: CPT

## 2024-11-05 PROCEDURE — 86762 RUBELLA ANTIBODY: CPT

## 2024-11-05 RX ORDER — SWAB
1 SWAB, NON-MEDICATED MISCELLANEOUS DAILY
COMMUNITY

## 2024-11-05 NOTE — LETTER
[facility] Letter       11/05/24       Charlotte Murray  YOB: 1995  [address] [place] [id number]         Charlotte Murray is our patient and under our office's care.  Charlotte's Estimated Date of Delivery: 6/2/25    Any questions or concerns feel free to contact our office.           Thank you,   [facility]/[place]     [phone]   [facility]/[place]     [phone]        [facility]/[place]    [phone]    [facility]     [phone]        [facility]     [phone]        [facility]     [phone]        [facility]     [phone]        [facility]     [phone]   [place]     [phone]   [place]     [phone]

## 2024-11-05 NOTE — LETTER
Proof of Pregnancy Letter    Charlotte Kearneyrinlory  1995  [address]  [place] [address]        11/05/24      Charlotte Murray is a patient at our facility. Charlotte Murray Estimated Date of Delivery: 6/2/25       Any questions or concerns, please feel free to contact our office.    Sincerely,    [facility] [place]    [address]  [address]   [place] [id number] [phone]

## 2024-11-05 NOTE — PROGRESS NOTES
OB INTAKE INTERVIEW  Pt presents for OB intake.     OB History    Para Term  AB Living   2 0 0 0 1 0   SAB IAB Ectopic Multiple Live Births   0 1 0 0 0      # Outcome Date GA Lbr Jerald/2nd Weight Sex Type Anes PTL Lv   2 Current            1 IAB               Obstetric Comments    - pregnancy termination     Hx of  delivery prior to 36 weeks 6 days:  N/A   If yes, place a referral for cervical surveillance at 16 weeks.   Last Menstrual Period:    Patient's last menstrual period was 2024.     Ultrasound date: 10/28/2024  9 weeks 0 days     Estimated Date of Delivery: 25   by LMP  H&P visit scheduled. 2024 @ 1300  with Dr. Solano     Last pap smear: 3/1/2022  Findings; lab pap smear results: atypical squamous cellularity of undetermined significance (ASCUS)    Current Issues:  Constipation :   No  Headaches :   Yes, uses ice and tylenol to manage   Cramping:  No  Spotting :   No  PICA cravings :  No  FOB Involved:   Yes  Planned pregnancy:  No  I have these concerns about this prenatal patient:   interspireSubmit  #[id number] used for visit today  Declines Flu vaccine   Reminded to complete 1st trimester labs  Applied for Medicaid as secondary. To meet with Care Management post intake to discuss further  Prefers [facility] for care, but [place] for delivery  Desires breastfeeding. Recommend Baby & Me Classes in 2nd trimester    Interview education  [facility] Pregnancy Essentials reviewed and discussed   Baby and Me Support Center Handout  [facility] MFM Handout  Discussed genetic testing  Prenatal lab work: Scripts printed and given to pt.   Influenza vaccine given today: No  Discussed Tdap vaccine.   Immunizations:   Immunization History   Administered Date(s) Administered    COVID-19, unspecified 2021     Depression Screening Follow-up Plan: Patient's depression screening was negative with an [place] score of  1  Clinically patient does not  have depression. No treatment is required..  Nurse/Family Partnership- referral placed:  Yes   If yes, place referral for nurse family partnership  Tobacco Cessation Counseling: non-smoker  Infection Screening: Does the pt have a hx of MRSA? No  If yes- please follow MRSA protocol and obtain a nasal swab for MRSA culture  The patient was oriented to our practice and all questions were answered.  Interviewed by: dian house RN 11/05/24

## 2024-11-05 NOTE — LETTER
Work Letter    Charlotte Murray  1995  [address]  [place] [address]    Dear Charlotte Murray,      11/05/24        Your employee is a patient at [facility].    We recommend that all pregnant women:    1. Have a well-ventilated workspace.  2. Wear low-heeled shoes.  3. Work no more than 40 hours per week.  4. Have a 15 minute break every 2 hours and at least 30 minutes for a meal break.   5. Use good body mechanics by bending at your knees to avoid back strain and lift no more than 20 pounds without assistance. Will need assistance with lifting over 20 lbs.   6. Have ready access to bathrooms and water.      She may continue to work until her due date unless medical complications arise. We anticipate she may return to work in 6-8 weeks after delivery.     Sincerely,  [facility]

## 2024-11-05 NOTE — LETTER
Dentist Letter    Charlotte Cuevas [facility]  1995  [address]  [place] [address]          11/05/24    We have had several requests from local dentist requesting permission to perform procedures on our patients who are pregnant. We wish to respond with this letter regarding some of the more routine procedures that we have been asked about.    The following procedures may be performed on our obstetric patients:   1. Administration of local anesthesia   2. Administration of antibiotics such as PCN, Ampicillin, and Erythromycin.   3. Administration of pain medications such as Tylenol, Tylenol with codeine, and if needed Percocet.   4. Shielded X-rays    Should you have any questions, please do not hesitate to contact at [phone].        Sincerely,    [facility]   [phone]

## 2024-11-05 NOTE — PROGRESS NOTES
SHANI GUARDADO met with 30 y/o-S- G2:P0:AB1-  Occitan speaking woman for pre shana assessment. Pt resides with FOB and reported both are happy the the unintended pregnancy. Pt denies nay usage of drug, Alcohol or smoking. Pt denies any Mental Health or Domestic Violence Hx. Pt is employed full time and [employer]. Pt denies transportation issues.    Pt claimed FOB and extended family are very supportive. Pt need to call WIC for appointment. Pt main concerns today is getting MA since she will not be able to afford her commercial insurance because her working hours has been reduce to less than 34 due to the pregnancy. Pt is awaiting for the outcome. `SHANI GUARDADO discussed possibility of not been approve du to having insurance but they may help as secondary. Pt will wait for DPW letter with outcome and will call as needed.    SHANI GUARDADO explained her role and provided contact information. Pt was encouraged ot sourav as needed.

## 2024-11-06 LAB
BACTERIA UR CULT: ABNORMAL
BACTERIA UR CULT: ABNORMAL
HBV SURFACE AB SER-ACNC: 83.4 MIU/ML
HBV SURFACE AG SER QL: NORMAL
HCV AB SER QL: NORMAL
HIV 1+2 AB+HIV1 P24 AG SERPL QL IA: NORMAL
HIV 2 AB SERPL QL IA: NORMAL
HIV1 AB SERPL QL IA: NORMAL
HIV1 P24 AG SERPL QL IA: NORMAL
TREPONEMA PALLIDUM IGG+IGM AB [PRESENCE] IN SERUM OR PLASMA BY IMMUNOASSAY: NORMAL

## 2024-11-11 ENCOUNTER — APPOINTMENT (OUTPATIENT)
Dept: LAB | Facility: CLINIC | Age: 29
End: 2024-11-11
Payer: COMMERCIAL

## 2024-11-11 DIAGNOSIS — N91.2 AMENORRHEA: ICD-10-CM

## 2024-11-11 PROCEDURE — 36415 COLL VENOUS BLD VENIPUNCTURE: CPT

## 2024-11-25 ENCOUNTER — ROUTINE PRENATAL (OUTPATIENT)
Dept: OBGYN CLINIC | Facility: CLINIC | Age: 29
End: 2024-11-25

## 2024-11-25 VITALS
BODY MASS INDEX: 26.16 KG/M2 | HEIGHT: 65 IN | DIASTOLIC BLOOD PRESSURE: 68 MMHG | WEIGHT: 157 LBS | HEART RATE: 103 BPM | SYSTOLIC BLOOD PRESSURE: 130 MMHG

## 2024-11-25 DIAGNOSIS — G44.209 ACUTE NON INTRACTABLE TENSION-TYPE HEADACHE: ICD-10-CM

## 2024-11-25 DIAGNOSIS — Z3A.13 13 WEEKS GESTATION OF PREGNANCY: Primary | ICD-10-CM

## 2024-11-25 PROCEDURE — 87491 CHLMYD TRACH DNA AMP PROBE: CPT

## 2024-11-25 PROCEDURE — 87591 N.GONORRHOEAE DNA AMP PROB: CPT

## 2024-11-25 PROCEDURE — G0145 SCR C/V CYTO,THINLAYER,RESCR: HCPCS

## 2024-11-25 PROCEDURE — 99213 OFFICE O/P EST LOW 20 MIN: CPT | Performed by: OBSTETRICS & GYNECOLOGY

## 2024-11-25 RX ORDER — CALCIUM CARBONATE/VITAMIN D3 500-10/5ML
400 LIQUID (ML) ORAL DAILY
Qty: 30 TABLET | Refills: 8 | Status: SHIPPED | OUTPATIENT
Start: 2024-11-25

## 2024-11-25 RX ORDER — CALCIUM CARBONATE/VITAMIN D3 500-10/5ML
400 LIQUID (ML) ORAL DAILY
Qty: 30 TABLET | Refills: 8 | Status: SHIPPED | OUTPATIENT
Start: 2024-11-25 | End: 2024-11-25 | Stop reason: CLARIF

## 2024-11-25 RX ORDER — METOCLOPRAMIDE 10 MG/1
10 TABLET ORAL DAILY PRN
Qty: 30 TABLET | Refills: 3 | Status: SHIPPED | OUTPATIENT
Start: 2024-11-25

## 2024-11-25 RX ORDER — FOLIC ACID 1 MG/1
1 TABLET ORAL
COMMUNITY

## 2024-11-25 NOTE — PROGRESS NOTES
OB/GYN  PRENATAL H&P VISIT  Charlotte Murray  2024  1:03 PM  Dr. Alda Solano MD      SUBJECTIVE  Charlotte Murray is a 29 y.o.  at 13w0d here for initial prenatal H&P.     She is currently doing well. She denies vaginal bleeding, cramping, leakage, abnormal discharge.     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   - She hx of Chlamydia 2 years ago that she and her partner were treated for, with neg DARVIN. Denies a hx of TB or close contacts with persons with TB. She has never had MRSA.   - She denies use of nicotine or recreational drug use. She denies use of ETOH.    Family History:  - She does not have a family history of inheritable conditions such as physical or intellectual disabilities, birth defects, blood disorders, heart or neural tube defects.       OB History    Para Term  AB Living   2 0 0 0 1 0   SAB IAB Ectopic Multiple Live Births   0 1 0 0 0      # Outcome Date GA Lbr Jerald/2nd Weight Sex Type Anes PTL Lv   2 Current            1 IAB               Obstetric Comments    - pregnancy termination         Past Medical History:   Diagnosis Date    Abnormal Pap smear of cervix      & : ASCUS    Asthma     Chlamydia        History reviewed. No pertinent surgical history.    Social History     Socioeconomic History    Marital status: /Civil Union     Spouse name: Not on file    Number of children: 0    Years of education: 12    Highest education level: High school graduate   Occupational History    Not on file   Tobacco Use    Smoking status: Never    Smokeless tobacco: Never   Vaping Use    Vaping status: Never Used   Substance and Sexual Activity    Alcohol use: Not Currently     Comment: social    Drug use: No    Sexual activity: Yes     Partners:  Male   Other Topics Concern    Not on file   Social History Narrative    Not on file     Social Drivers of Health     Financial Resource Strain: Low Risk  (10/28/2024)    Overall Financial Resource Strain (CARDIA)     Difficulty of Paying Living Expenses: Not hard at all   Food Insecurity: No Food Insecurity (10/28/2024)    Hunger Vital Sign     Worried About Running Out of Food in the Last Year: Never true     Ran Out of Food in the Last Year: Never true   Transportation Needs: No Transportation Needs (10/28/2024)    PRAPARE - Transportation     Lack of Transportation (Medical): No     Lack of Transportation (Non-Medical): No   Physical Activity: Not on file   Stress: No Stress Concern Present (11/5/2024)    [place] [employer] - Occupational Stress Questionnaire     Feeling of Stress : Only a little   Social Connections: Socially Integrated (11/5/2024)    Social Connection and Isolation Panel [NHANES]     Frequency of Communication with Friends and Family: More than three times a week     Frequency of Social Gatherings with Friends and Family: Twice a week     Attends Rastafari Services: More than 4 times per year     Active Member of Clubs or Organizations: Yes     Attends Club or Organization Meetings: More than 4 times per year     Marital Status: Living with partner   Intimate Partner Violence: Not At Risk (11/5/2024)    Humiliation, Afraid, Rape, and Kick questionnaire     Fear of Current or Ex-Partner: No     Emotionally Abused: No     Physically Abused: No     Sexually Abused: No   Housing Stability: Low Risk  (10/28/2024)    Housing Stability Vital Sign     Unable to Pay for Housing in the Last Year: No     Number of Times Moved in the Last Year: 0     Homeless in the Last Year: No       OBJECTIVE  Vitals:    11/25/24 1200   BP: 130/68   Pulse: 103       General: Well appearing, no distress  Respiratory: Unlabored breathing  Cardiovascular: Regular rate.  Abdomen: Soft, gravid,  "nontender  Fundal Height: Appropriate for gestational age.  Extremities: Warm and well perfused.  Non tender.    ASSESSMENT AND PLAN    29 y.o., , with /68 (BP Location: Right arm, Cuff Size: Standard)   Pulse 103   Ht 5' 5\" (1.651 m)   Wt 71.2 kg (157 lb)   LMP 2024 , at 13w0d here for her prenatal H&P.    Pregnancy: H&P completed today. PN Labs reviewed today, all wnl Labor expectations discussed with patient, including appointment schedule, nutrition, exercise, medications, sexual intercourse, and nausea/vomiting. Patient's BMI is 26. Recommended weight gain is 15-25 lbs.  by YANCY          Overview:  Labs  Pap smear 2022 ASCUS, no HPV done, collected 24 and GC/CT collected 24  Prenatal panel wnl  MSAFP to be offered on 24  28w [ ]  Vaccines:  Flu vaccine: declined despite counseling on increased risk of severe illness during pregnancy, will continue thinking about it  Covid vaccine: one dose before pregnancy  Tdap vaccine: offer at 28 weeks  Contraception: considering OCPs vs Nexplanon vs IUD  Feeding plan: breastfeeding  Birth plan:  with epidural  Delivery consent: to be signed at 28w  Ultrasounds: Level 1 scheduled for 24    Screening: Pap smear collected 24. GC/CT collected. Genetic screening reviewed with patient - will proceed with sequential screen. MSAFP discussed to be done between 16-18w and ordered.     Headache: Mag ox daily to prevent headaches and Tylenol 1g and Reglan for treatment.     Problem List       Hemorrhagic cyst of right ovary (Chronic)    Atypical squamous cells of undetermined significance (ASCUS) on Papanicolaou smear of cervix    Overview   Needs repeat PAP in 1 year         Infertility, female       Alda Solano MD  2024  1:03 PM       "

## 2024-11-25 NOTE — PROGRESS NOTES
Please refer to the [facility] ultrasound report in Ob Procedures for additional information regarding today's visit

## 2024-11-26 ENCOUNTER — ROUTINE PRENATAL (OUTPATIENT)
Age: 29
End: 2024-11-26
Payer: COMMERCIAL

## 2024-11-26 VITALS
BODY MASS INDEX: 26.33 KG/M2 | DIASTOLIC BLOOD PRESSURE: 52 MMHG | SYSTOLIC BLOOD PRESSURE: 92 MMHG | HEIGHT: 65 IN | WEIGHT: 158 LBS | HEART RATE: 77 BPM

## 2024-11-26 DIAGNOSIS — Z3A.13 13 WEEKS GESTATION OF PREGNANCY: ICD-10-CM

## 2024-11-26 DIAGNOSIS — N83.209 OVARIAN CYST COMPLICATING PREGNANCY, ANTEPARTUM, FIRST TRIMESTER: ICD-10-CM

## 2024-11-26 DIAGNOSIS — O34.81 OVARIAN CYST COMPLICATING PREGNANCY, ANTEPARTUM, FIRST TRIMESTER: ICD-10-CM

## 2024-11-26 DIAGNOSIS — O99.511 ASTHMA AFFECTING PREGNANCY IN FIRST TRIMESTER: ICD-10-CM

## 2024-11-26 DIAGNOSIS — N91.2 AMENORRHEA: ICD-10-CM

## 2024-11-26 DIAGNOSIS — J45.909 ASTHMA AFFECTING PREGNANCY IN FIRST TRIMESTER: ICD-10-CM

## 2024-11-26 DIAGNOSIS — Z36.82 ENCOUNTER FOR ANTENATAL SCREENING FOR NUCHAL TRANSLUCENCY: Primary | ICD-10-CM

## 2024-11-26 LAB — SCAN RESULT: NORMAL

## 2024-11-26 PROCEDURE — 76813 OB US NUCHAL MEAS 1 GEST: CPT | Performed by: OBSTETRICS & GYNECOLOGY

## 2024-11-26 PROCEDURE — 99203 OFFICE O/P NEW LOW 30 MIN: CPT | Performed by: OBSTETRICS & GYNECOLOGY

## 2024-11-26 PROCEDURE — 76801 OB US < 14 WKS SINGLE FETUS: CPT | Performed by: OBSTETRICS & GYNECOLOGY

## 2024-11-26 NOTE — PROGRESS NOTES
Patient would like to have [facility] [id number] Non-Invasive Prenatal Screen [id number] PLUS w/ SCA, WITH fetal sex. Patient's insurance  does not match  listed in chart. Confirmed with patient the  in her chart is correct. Instructed patient to call insurance company to update  for Medicaid. Patient will give MFM a call back once this is completed to schedule a nurse visit for NIPT blood draw.       Patient given brochure.  Provided [facility] contact information. General inquiries [phone], Cost estimates [phone] and [facility] Billing [phone]. Website [email or web address].       Patient verbalized understanding of all instructions and no questions at this time.

## 2024-11-26 NOTE — LETTER
November 26, 2024     [facility] PROVIDER    Patient: Charlotte Murray   YOB: 1995   Date of Visit: 11/26/2024       Dear  Provider:    Thank you for referring Charlotte Murray to me for evaluation. Below are my notes for this consultation.    If you have questions, please do not hesitate to call me. I look forward to following your patient along with you.         Sincerely,        Marcellus Hanley MD        CC: No Recipients    Marcellus Hanley MD  11/26/2024  1:07 PM  Sign when Signing Visit  Please refer to the [facility] ultrasound report in Ob Procedures for additional information regarding today's visit

## 2024-11-27 LAB
C TRACH DNA SPEC QL NAA+PROBE: NEGATIVE
N GONORRHOEA DNA SPEC QL NAA+PROBE: NEGATIVE

## 2024-11-29 ENCOUNTER — RESULTS FOLLOW-UP (OUTPATIENT)
Dept: LABOR AND DELIVERY | Facility: HOSPITAL | Age: 29
End: 2024-11-29

## 2024-12-02 LAB
LAB AP GYN PRIMARY INTERPRETATION: NORMAL
LAB AP LMP: NORMAL
Lab: NORMAL

## 2024-12-05 ENCOUNTER — RESULTS FOLLOW-UP (OUTPATIENT)
Dept: OBGYN CLINIC | Facility: CLINIC | Age: 29
End: 2024-12-05

## 2024-12-05 ENCOUNTER — OFFICE VISIT (OUTPATIENT)
Age: 29
End: 2024-12-05
Payer: COMMERCIAL

## 2024-12-05 DIAGNOSIS — Z33.1 PREGNANT STATE, INCIDENTAL: Primary | ICD-10-CM

## 2024-12-05 DIAGNOSIS — Z36.9 ENCOUNTER FOR ANTENATAL SCREENING: ICD-10-CM

## 2024-12-05 PROCEDURE — 36415 COLL VENOUS BLD VENIPUNCTURE: CPT | Performed by: OBSTETRICS & GYNECOLOGY

## 2024-12-05 NOTE — PROGRESS NOTES
Patient chose to have [facility] [id number] Non-Invasive Prenatal Screen [id number] PLUS w/ SCA, WITH fetal sex.  Patient choose to be billed through insurance.     Patient given brochure and is aware [facility] will contact patient's insurance and coordinate coverage.  Provided [facility] contact information. General inquiries [phone], Cost estimates [phone] and [facility] Billing [phone]. Website "[email or web address].     Blood collection tubes labeled with patient identifiers (name, medical record number, and date of birth).     Filled out [facility] order form. Patient chose to have blood drawn in our office at time of visit. NIPS was drawn from left arm with a straight needle by Bernadette Suarez. .      If patient chose to have blood work drawn at a [facility] lab we requested patient notify MFM (via phone call or [employer] message) when blood collected so office can follow up on results.       Maternal Fetal Medicine will have results in approximately 5-7 business days and will call patient or notify via [employer].  Patient aware viewing lab result online will reveal fetal sex if ordered.    Patient verbalized understanding of all instructions and no questions at this time.

## 2024-12-05 NOTE — PROGRESS NOTES
[facility]: Ms. Mason Murray was seen today at 14w3d gestational age for  blood draw only .       Dora LADD

## 2024-12-10 LAB
CFDNA.FET/CFDNA.TOTAL SFR FETUS: NORMAL %
CITATION REF LAB TEST: NORMAL
FET 13+18+21+X+Y ANEUP PLAS.CFDNA: NEGATIVE
FET CHR 21 TS PLAS.CFDNA QL: NEGATIVE
FET CHR 21 TS PLAS.CFDNA QL: NEGATIVE
FET MS X RISK WBC.DNA+CFDNA QL: NOT DETECTED
FET SEX PLAS.CFDNA DOSAGE CFDNA: NORMAL
FET TS 13 RISK PLAS.CFDNA QL: NEGATIVE
FET X + Y ANEUP RISK PLAS.CFDNA SEQ-IMP: NOT DETECTED
GA EST FROM CONCEPTION DATE: NORMAL D
GESTATIONAL AGE > 9:: YES
LAB DIRECTOR NAME PROVIDER: NORMAL
LAB DIRECTOR NAME PROVIDER: NORMAL
LABORATORY COMMENT REPORT: NORMAL
LIMITATIONS OF THE TEST: NORMAL
NEGATIVE PREDICTIVE VALUE: NORMAL
PERFORMANCE CHARACTERISTICS: NORMAL
POSITIVE PREDICTIVE VALUE: NORMAL
REF LAB TEST METHOD: NORMAL
SL AMB NOTE:: NORMAL
TEST PERFORMANCE INFO SPEC: NORMAL

## 2024-12-11 ENCOUNTER — RESULTS FOLLOW-UP (OUTPATIENT)
Dept: PERINATAL CARE | Facility: CLINIC | Age: 29
End: 2024-12-11

## 2024-12-23 ENCOUNTER — ROUTINE PRENATAL (OUTPATIENT)
Dept: OBGYN CLINIC | Facility: CLINIC | Age: 29
End: 2024-12-23

## 2024-12-23 ENCOUNTER — APPOINTMENT (OUTPATIENT)
Dept: LAB | Facility: CLINIC | Age: 29
End: 2024-12-23
Payer: MEDICARE

## 2024-12-23 VITALS
DIASTOLIC BLOOD PRESSURE: 78 MMHG | WEIGHT: 160 LBS | HEART RATE: 97 BPM | BODY MASS INDEX: 26.66 KG/M2 | HEIGHT: 65 IN | SYSTOLIC BLOOD PRESSURE: 126 MMHG

## 2024-12-23 DIAGNOSIS — Z34.92 PRENATAL CARE IN SECOND TRIMESTER: ICD-10-CM

## 2024-12-23 DIAGNOSIS — Z3A.17 17 WEEKS GESTATION OF PREGNANCY: Primary | ICD-10-CM

## 2024-12-23 DIAGNOSIS — Z3A.17 17 WEEKS GESTATION OF PREGNANCY: ICD-10-CM

## 2024-12-23 PROCEDURE — 99213 OFFICE O/P EST LOW 20 MIN: CPT | Performed by: NURSE PRACTITIONER

## 2024-12-23 PROCEDURE — 82105 ALPHA-FETOPROTEIN SERUM: CPT

## 2024-12-23 PROCEDURE — 36415 COLL VENOUS BLD VENIPUNCTURE: CPT

## 2024-12-23 NOTE — PROGRESS NOTES
Charlotte Murray presents today for routine OB visit at 17w0d. She is a .     Blood Pressure: 126/78  Wt=72.6 kg (160 lb); Body mass index is 26.63 kg/m².; TWG=3.629 kg (8 lb)  Fetal Heart Rate: 150;    Abdomen: gravid, soft, non-tender.  She reports no complaints.  Denies uterine contractions or persistent cramping.  Denies vaginal bleeding or leaking of fluid.  Reports fetal movement.  Scheduled for ultrasound 25.    Orders placed for AFP, reviewed collection timing.  Reviewed common discomforts of pregnancy in second trimester and warning signs.  Advised to continue medications and return in 4 weeks.      Current Outpatient Medications   Medication Instructions    doxylamine (UNISOM) 12.5 mg, Oral, Daily at bedtime PRN    folic acid (FOLVITE) 1 mg    Magnesium Oxide 400 mg, Oral, Daily    metoclopramide (REGLAN) 10 mg, Oral, Daily PRN    Prenatal Vit-Fe Fumarate-FA (prenatal multivitamin) 28-0.8 MG TABS 1 tablet, Daily    pyridoxine (VITAMIN B6) 50 mg, Oral, Daily         G2 Problems (from 10/28/24 to present)       Problem Noted Diagnosed Resolved    17 weeks gestation of pregnancy 2024 by Alda Solano MD  No    Overview Addendum 2024  1:38 PM by WILBERTO Verduzco   Labs  Pap smear 2024 NILM, GC/CT negative   Prenatal panel wnl  NIPS low risk   MSAFP ordered   28w [ ]  Vaccines:  Flu vaccine: declined despite counseling on increased risk of severe illness during pregnancy, will continue thinking about it  Covid vaccine: one dose before pregnancy  Tdap vaccine: offer at 28 weeks  Contraception: considering OCPs vs Nexplanon vs IUD  Feeding plan: breastfeeding  Birth plan:  with epidural  Delivery consent: to be signed at 28w  Ultrasounds:   24 13w1d: Normal US. Return for level II.   25 scheduled w/ MFM

## 2024-12-27 ENCOUNTER — RESULTS FOLLOW-UP (OUTPATIENT)
Dept: OBGYN CLINIC | Facility: CLINIC | Age: 29
End: 2024-12-27

## 2024-12-27 LAB
2ND TRIMESTER 4 SCREEN SERPL-IMP: NORMAL
AFP ADJ MOM SERPL: 0.97
AFP INTERP AMN-IMP: NORMAL
AFP INTERP SERPL-IMP: NORMAL
AFP INTERP SERPL-IMP: NORMAL
AFP SERPL-MCNC: 35.5 NG/ML
AGE AT DELIVERY: 27.9 YR
GA METHOD: NORMAL
GA: 17 WEEKS
IDDM PATIENT QL: NO
MULTIPLE PREGNANCY: NO
NEURAL TUBE DEFECT RISK FETUS: NORMAL %

## 2024-12-27 NOTE — TELEPHONE ENCOUNTER
----- Message from WILBERTO Verduzco sent at 12/27/2024  7:30 AM EST -----  Please let her know the AFP screening (for spina bifida) is negative. Thank you!       [id number] assisted with informing the pt of her neg AFP screening.

## 2025-01-20 ENCOUNTER — ROUTINE PRENATAL (OUTPATIENT)
Dept: OBGYN CLINIC | Facility: CLINIC | Age: 30
End: 2025-01-20

## 2025-01-20 VITALS
HEIGHT: 65 IN | SYSTOLIC BLOOD PRESSURE: 122 MMHG | WEIGHT: 169 LBS | BODY MASS INDEX: 28.16 KG/M2 | DIASTOLIC BLOOD PRESSURE: 56 MMHG | HEART RATE: 95 BPM

## 2025-01-20 DIAGNOSIS — Z3A.21 21 WEEKS GESTATION OF PREGNANCY: ICD-10-CM

## 2025-01-20 DIAGNOSIS — Z34.92 PRENATAL CARE IN SECOND TRIMESTER: Primary | ICD-10-CM

## 2025-01-20 LAB
BACTERIA UR QL AUTO: ABNORMAL /HPF
BILIRUB UR QL STRIP: NEGATIVE
CLARITY UR: ABNORMAL
COLOR UR: ABNORMAL
GLUCOSE UR STRIP-MCNC: NEGATIVE MG/DL
HGB UR QL STRIP.AUTO: NEGATIVE
KETONES UR STRIP-MCNC: NEGATIVE MG/DL
LEUKOCYTE ESTERASE UR QL STRIP: ABNORMAL
MUCOUS THREADS UR QL AUTO: ABNORMAL
NITRITE UR QL STRIP: NEGATIVE
NON-SQ EPI CELLS URNS QL MICRO: ABNORMAL /HPF
PH UR STRIP.AUTO: 6.5 [PH]
PROT UR STRIP-MCNC: ABNORMAL MG/DL
RBC #/AREA URNS AUTO: ABNORMAL /HPF
SL AMB  POCT GLUCOSE, UA: NORMAL
SL AMB LEUKOCYTE ESTERASE,UA: NORMAL
SL AMB POCT BILIRUBIN,UA: NORMAL
SL AMB POCT BLOOD,UA: NORMAL
SL AMB POCT CLARITY,UA: CLEAR
SL AMB POCT COLOR,UA: YELLOW
SL AMB POCT KETONES,UA: NORMAL
SL AMB POCT NITRITE,UA: NORMAL
SL AMB POCT PH,UA: 6
SL AMB POCT SPECIFIC GRAVITY,UA: 1.02
SL AMB POCT URINE PROTEIN: NORMAL
SL AMB POCT UROBILINOGEN: 0.2
SP GR UR STRIP.AUTO: 1.02 (ref 1–1.03)
UROBILINOGEN UR STRIP-ACNC: <2 MG/DL
WBC #/AREA URNS AUTO: ABNORMAL /HPF

## 2025-01-20 PROCEDURE — 81003 URINALYSIS AUTO W/O SCOPE: CPT | Performed by: OBSTETRICS & GYNECOLOGY

## 2025-01-20 PROCEDURE — 81001 URINALYSIS AUTO W/SCOPE: CPT

## 2025-01-20 PROCEDURE — 87086 URINE CULTURE/COLONY COUNT: CPT

## 2025-01-20 PROCEDURE — 99213 OFFICE O/P EST LOW 20 MIN: CPT | Performed by: OBSTETRICS & GYNECOLOGY

## 2025-01-20 NOTE — PROGRESS NOTES
[facility] WOMEN'S HEALTH   PRENATAL VISIT  Charlotte Cuevas Escofet  [id number]  1995        A/P:  Problem List       Hemorrhagic cyst of right ovary (Chronic)    Atypical squamous cells of undetermined significance (ASCUS) on Papanicolaou smear of cervix    Overview   Needs repeat PAP in 1 year         Infertility, female    21 weeks gestation of pregnancy    Overview   Labs  Pap smear 2024 NILM, GC/CT negative   Prenatal panel wnl  NIPS low risk   MSAFP screen negative  28w [ ]  Vaccines:  Flu vaccine: declined despite counseling on increased risk of severe illness during pregnancy, will continue thinking about it  Covid vaccine: one dose before pregnancy  Tdap vaccine: offer at 28 weeks  Contraception: considering OCPs vs Nexplanon vs IUD  Feeding plan: breastfeeding  Birth plan:  with epidural  Delivery consent: to be signed at 28w [ ]  Ultrasounds:   24 13w1d: Normal US. Return for level II.   25 scheduled w/ MFM          Current Assessment & Plan   RTO in 4 weeks for routine PNV          Other Visit Diagnoses         Prenatal care in second trimester    -  Primary                S: 29 y.o.  21w0d here for PN visit. She denies contractions but has occasional groin pain with movement. She denies leakage of fluid and vaginal bleeding. She has started to feel some fetal movement. She reports dysuria and difficulty starting to void for the last week.    O:  Vitals:    25 1300   BP: 122/56   Pulse: 95     Physical Exam  GEN: The patient was alert and oriented x3, pleasant well-appearing female in no acute distress.   CV: Regular rate  PULM: Non-labored respirations  MSK: Normal gait  Skin: Warm, dry  Neuro: No focal deficits  Psych: Normal affect and judgement, cooperative      Urine dip in office wnl  Will send for microscopy and culture    Fetal Heart Rate: 155       D/w Dr. Home Crockett MD  OB/GYN PGY-4  2025  1:27 PM

## 2025-01-21 ENCOUNTER — ROUTINE PRENATAL (OUTPATIENT)
Age: 30
End: 2025-01-21
Payer: MEDICARE

## 2025-01-21 VITALS
BODY MASS INDEX: 28.32 KG/M2 | HEART RATE: 115 BPM | WEIGHT: 170 LBS | DIASTOLIC BLOOD PRESSURE: 58 MMHG | SYSTOLIC BLOOD PRESSURE: 112 MMHG | HEIGHT: 65 IN

## 2025-01-21 DIAGNOSIS — Z36.86 ENCOUNTER FOR ANTENATAL SCREENING FOR CERVICAL LENGTH: ICD-10-CM

## 2025-01-21 DIAGNOSIS — Z3A.21 21 WEEKS GESTATION OF PREGNANCY: Primary | ICD-10-CM

## 2025-01-21 DIAGNOSIS — Z36.3 ENCOUNTER FOR ANTENATAL SCREENING FOR MALFORMATIONS: ICD-10-CM

## 2025-01-21 LAB — BACTERIA UR CULT: ABNORMAL

## 2025-01-21 PROCEDURE — 76817 TRANSVAGINAL US OBSTETRIC: CPT | Performed by: OBSTETRICS & GYNECOLOGY

## 2025-01-21 PROCEDURE — 76805 OB US >/= 14 WKS SNGL FETUS: CPT | Performed by: OBSTETRICS & GYNECOLOGY

## 2025-01-21 NOTE — PROGRESS NOTES
Ultrasound Probe Disinfection    A transvaginal ultrasound was performed.   Prior to use, disinfection was performed with High Level Disinfection Process ([employer]).  Probe serial number S2: [id number] was used.    Chelsie Gonzalez  01/21/25  7:50 AM

## 2025-01-22 ENCOUNTER — PATIENT OUTREACH (OUTPATIENT)
Dept: OBGYN CLINIC | Facility: CLINIC | Age: 30
End: 2025-01-22

## 2025-01-22 NOTE — PROGRESS NOTES
SHANI GUARDADO spoke with Pt for follow up. Pt reported she got MA. Pt is working and stated everything is going well. Pt denies any question or concerns at this time.Pt was encouraged to call SHANI GUARDADO as needed.

## 2025-02-10 ENCOUNTER — PATIENT OUTREACH (OUTPATIENT)
Dept: OBGYN CLINIC | Facility: CLINIC | Age: 30
End: 2025-02-10

## 2025-02-10 NOTE — PROGRESS NOTES
SHANI GUARDADO  met with Pt on her request. Pt with issues regarding work's accomodation. Pt states provider gave her a letter and they added another brake and allow her more bathroom brakes. Pt reported she has been very stressed because sometimes she can not meet her daily tasks. Pt was asked by employed to get another letter from provided stating she can not performed her duties. Pt was informed that per chart reviewed so far her pregnancy is healthy and she must talk to her provider next OV. Meanwhile Pt was advice to speak with her Union Rep to discussed her needs. Pt verbalized understanding.

## 2025-02-17 ENCOUNTER — ROUTINE PRENATAL (OUTPATIENT)
Dept: OBGYN CLINIC | Facility: CLINIC | Age: 30
End: 2025-02-17

## 2025-02-17 VITALS
DIASTOLIC BLOOD PRESSURE: 57 MMHG | BODY MASS INDEX: 28.99 KG/M2 | SYSTOLIC BLOOD PRESSURE: 129 MMHG | WEIGHT: 174 LBS | HEIGHT: 65 IN | HEART RATE: 99 BPM

## 2025-02-17 DIAGNOSIS — Z3A.25 25 WEEKS GESTATION OF PREGNANCY: Primary | ICD-10-CM

## 2025-02-17 PROBLEM — R87.610 ATYPICAL SQUAMOUS CELLS OF UNDETERMINED SIGNIFICANCE (ASCUS) ON PAPANICOLAOU SMEAR OF CERVIX: Status: RESOLVED | Noted: 2019-07-17 | Resolved: 2025-02-17

## 2025-02-17 PROBLEM — N97.9 INFERTILITY, FEMALE: Status: RESOLVED | Noted: 2022-01-24 | Resolved: 2025-02-17

## 2025-02-17 PROBLEM — N83.201 HEMORRHAGIC CYST OF RIGHT OVARY: Chronic | Status: RESOLVED | Noted: 2019-06-12 | Resolved: 2025-02-17

## 2025-02-17 PROCEDURE — 99213 OFFICE O/P EST LOW 20 MIN: CPT | Performed by: OBSTETRICS & GYNECOLOGY

## 2025-02-17 RX ORDER — SWAB
1 SWAB, NON-MEDICATED MISCELLANEOUS DAILY
Qty: 90 TABLET | Refills: 2 | Status: SHIPPED | OUTPATIENT
Start: 2025-02-17

## 2025-02-17 NOTE — PROGRESS NOTES
[facility] WOMEN'S HEALTH   PRENATAL VISIT  Charlotte Cuevas Escofet  [id number]  1995        A/P:  Problem List       25 weeks gestation of pregnancy    Overview   Labs  Pap smear 2024 NILM, GC/CT negative   Prenatal panel wnl  NIPS low risk   MSAFP screen negative  28w [ ] ordered  Vaccines:  Flu vaccine: declined   Covid vaccine: one dose before pregnancy  Tdap vaccine: offer at 28 weeks  Contraception: considering OCPs vs Nexplanon vs IUD  Feeding plan: breastfeeding  Birth plan:  with epidural  Delivery consent: to be signed at 28w [ ]  Ultrasounds:   24 13w1d: Normal US. Return for level II.   25: normal anatomy, no further US needed                S: 29 y.o.  25w0d here for PN visit. She denies contractions. She denies leakage of fluid and vaginal bleeding. She has felt some fetal movement. She does have swelling of both feet and requested a work for note to allow her to sit down as needed.    O:  Vitals:    25 1300   BP: 129/57   Pulse: 99     Physical Exam  Jolanta Crockett MD   OB/Gyn PGY-4  1:38 PM  25      Fetal Heart Rate: 140       D/w Dr. Home Crockett MD  OB/GYN PGY-4  2025  1:38 PM

## 2025-02-17 NOTE — LETTER
February 20, 2025     Patient: Charlotte Murray  YOB: 1995  Date of Visit: 2/17/2025      To Whom it May Concern:    Charlotte Murray is under my professional care. Charlotte was seen in my office on 2/17/2025. Charlotte may return to work with limitations on 2/18/25. For the remainder of her pregnancy, she should be allowed to sit down for at least 5 min of every hour and allowed to use the restroom at least once every 2 hours. She should not be lifting more than 20 lbs.    If you have any questions or concerns, please don't hesitate to call.         Sincerely,          Jolanta Crockett MD        CC: No Recipients

## 2025-02-17 NOTE — LETTER
February 17, 2025     Patient: Charlotte Murray  YOB: 1995  Date of Visit: 2/17/2025      To Whom it May Concern:    Charlotte Murray is under my professional care. Charlotte was seen in my office on 2/17/2025. Charlotte may return to work with limitations on 2/18/25 . She should be allowed to sit down for at least 5 min of every hour and allowed to use the restroom at least once every 2 hours. She should not be lifting more than 20 lbs.    If you have any questions or concerns, please don't hesitate to call.         Sincerely,          Jolanta Crockett MD        CC: No Recipients

## 2025-02-20 ENCOUNTER — TELEPHONE (OUTPATIENT)
Dept: OBGYN CLINIC | Facility: CLINIC | Age: 30
End: 2025-02-20

## 2025-02-20 NOTE — TELEPHONE ENCOUNTER
Edna from [facility] called to clarify if the letter dated on 2/17/25 is for the duration of pt's pregnancy? if so please update letter to reflect. Also will you support the pt with job modification, or job restructuring? because the pt is considering changing her position while pregnant. You call call Edna direct at [phone] if necessary.

## 2025-02-22 ENCOUNTER — APPOINTMENT (OUTPATIENT)
Dept: LAB | Facility: CLINIC | Age: 30
End: 2025-02-22
Payer: MEDICARE

## 2025-02-22 DIAGNOSIS — Z3A.25 25 WEEKS GESTATION OF PREGNANCY: ICD-10-CM

## 2025-02-22 LAB
ERYTHROCYTE [DISTWIDTH] IN BLOOD BY AUTOMATED COUNT: 14.4 % (ref 11.6–15.1)
GLUCOSE 1H P 50 G GLC PO SERPL-MCNC: 123 MG/DL (ref 70–134)
HCT VFR BLD AUTO: 41.2 % (ref 34.8–46.1)
HGB BLD-MCNC: 13 G/DL (ref 11.5–15.4)
MCH RBC QN AUTO: 30.8 PG (ref 26.8–34.3)
MCHC RBC AUTO-ENTMCNC: 31.6 G/DL (ref 31.4–37.4)
MCV RBC AUTO: 98 FL (ref 82–98)
PLATELET # BLD AUTO: 245 THOUSANDS/UL (ref 149–390)
PMV BLD AUTO: 9.6 FL (ref 8.9–12.7)
RBC # BLD AUTO: 4.22 MILLION/UL (ref 3.81–5.12)
WBC # BLD AUTO: 9.81 THOUSAND/UL (ref 4.31–10.16)

## 2025-02-22 PROCEDURE — 85027 COMPLETE CBC AUTOMATED: CPT

## 2025-02-22 PROCEDURE — 36415 COLL VENOUS BLD VENIPUNCTURE: CPT

## 2025-02-22 PROCEDURE — 86780 TREPONEMA PALLIDUM: CPT

## 2025-02-22 PROCEDURE — 82950 GLUCOSE TEST: CPT

## 2025-02-23 LAB — TREPONEMA PALLIDUM IGG+IGM AB [PRESENCE] IN SERUM OR PLASMA BY IMMUNOASSAY: NORMAL

## 2025-02-24 ENCOUNTER — RESULTS FOLLOW-UP (OUTPATIENT)
Dept: OTHER | Facility: HOSPITAL | Age: 30
End: 2025-02-24

## 2025-02-25 NOTE — RESULT ENCOUNTER NOTE
Spoke with patient to confirm negative lab results. Patient stated understanding and had no further questions.

## 2025-03-10 ENCOUNTER — ROUTINE PRENATAL (OUTPATIENT)
Dept: OBGYN CLINIC | Facility: CLINIC | Age: 30
End: 2025-03-10

## 2025-03-10 VITALS
HEIGHT: 65 IN | WEIGHT: 182 LBS | BODY MASS INDEX: 30.32 KG/M2 | DIASTOLIC BLOOD PRESSURE: 60 MMHG | HEART RATE: 104 BPM | SYSTOLIC BLOOD PRESSURE: 135 MMHG

## 2025-03-10 DIAGNOSIS — Z34.93 PRENATAL CARE IN THIRD TRIMESTER: Primary | ICD-10-CM

## 2025-03-10 DIAGNOSIS — Z23 ENCOUNTER FOR IMMUNIZATION: ICD-10-CM

## 2025-03-10 DIAGNOSIS — Z3A.28 28 WEEKS GESTATION OF PREGNANCY: ICD-10-CM

## 2025-03-10 PROCEDURE — 90471 IMMUNIZATION ADMIN: CPT | Performed by: OBSTETRICS & GYNECOLOGY

## 2025-03-10 PROCEDURE — 90715 TDAP VACCINE 7 YRS/> IM: CPT | Performed by: OBSTETRICS & GYNECOLOGY

## 2025-03-10 PROCEDURE — 99213 OFFICE O/P EST LOW 20 MIN: CPT | Performed by: OBSTETRICS & GYNECOLOGY

## 2025-03-10 NOTE — PROGRESS NOTES
[facility] WOMEN'S HEALTH   PRENATAL VISIT  Charlotte Cuevas Escofet  [id number]  1995        A/P:  Problem List       28 weeks gestation of pregnancy    Overview   Labs  Pap smear 2024 NILM, GC/CT negative   Prenatal panel wnl  NIPS low risk   MSAFP screen negative  28w labs wnl  Vaccines:  Flu vaccine: declined   Covid vaccine: one dose before pregnancy  Tdap vaccine: received 3/10/25  Contraception: considering OCPs vs Nexplanon vs IUD  Feeding plan: breastfeeding  Birth plan:  with epidural  Delivery consent: signed 3/10/25  Ultrasounds:   24 13w1d: Normal US. Return for level II.   25: normal anatomy, no further US needed         Current Assessment & Plan   RTO for routine PN in 1 month                S: 29 y.o.  28w0d here for PN visit. She denies contractions. She denies leakage of fluid and vaginal bleeding. She has felt good fetal movement.     O:  Vitals:    03/10/25 1300   BP: 135/60   Pulse: 104     Physical Exam  GEN: The patient was alert and oriented x3, pleasant well-appearing female in no acute distress.   CV: Regular rate  PULM: Non-labored respirations  MSK: Normal gait  Skin: Warm, dry  Neuro: No focal deficits  Psych: Normal affect and judgement, cooperative      Fetal Heart Rate: 155       D/w Dr. Home Crockett MD  OB/GYN PGY-4  3/10/2025  2:11 PM

## 2025-03-10 NOTE — PROGRESS NOTES
28 week education packet provided to patient on 03/10/25.    kenxus # [id number]    Included in packet:  Third Trimester paperwork  Delivery consent   Birthing room support person rules and acknowledgment  Birth Plan   Welcome information  Birth certificate worksheet   Consent for Photographers  Perineal/ Vaginal massage   Pediatric practices and locations      Breast feeding / pump ordered  TDAP given

## 2025-03-13 LAB
DME PARACHUTE DELIVERY DATE REQUESTED: NORMAL
DME PARACHUTE ITEM DESCRIPTION: NORMAL
DME PARACHUTE ORDER STATUS: NORMAL
DME PARACHUTE SUPPLIER NAME: NORMAL
DME PARACHUTE SUPPLIER PHONE: NORMAL

## 2025-03-26 LAB
DME PARACHUTE DELIVERY DATE ACTUAL: NORMAL
DME PARACHUTE DELIVERY DATE REQUESTED: NORMAL
DME PARACHUTE ITEM DESCRIPTION: NORMAL
DME PARACHUTE ORDER STATUS: NORMAL
DME PARACHUTE SUPPLIER NAME: NORMAL
DME PARACHUTE SUPPLIER PHONE: NORMAL

## 2025-03-31 ENCOUNTER — ROUTINE PRENATAL (OUTPATIENT)
Dept: OBGYN CLINIC | Facility: CLINIC | Age: 30
End: 2025-03-31

## 2025-03-31 VITALS
WEIGHT: 187.2 LBS | HEART RATE: 101 BPM | HEIGHT: 65 IN | BODY MASS INDEX: 31.19 KG/M2 | SYSTOLIC BLOOD PRESSURE: 109 MMHG | DIASTOLIC BLOOD PRESSURE: 74 MMHG

## 2025-03-31 DIAGNOSIS — Z3A.31 31 WEEKS GESTATION OF PREGNANCY: Primary | ICD-10-CM

## 2025-03-31 DIAGNOSIS — Z34.93 PRENATAL CARE IN THIRD TRIMESTER: ICD-10-CM

## 2025-03-31 PROCEDURE — 99213 OFFICE O/P EST LOW 20 MIN: CPT | Performed by: NURSE PRACTITIONER

## 2025-03-31 NOTE — PROGRESS NOTES
Charlotte Murray presents today for routine OB visit at 31w0d. She is a .     Blood Pressure: 109/74  Wt=84.9 kg (187 lb 3.2 oz); Body mass index is 31.15 kg/m².; TWG=16 kg (35 lb 3.2 oz)  Fetal Heart Rate: 145; Fundal Height (cm): 31 cm  Abdomen: gravid, soft, non-tender.  She reports continued and worsening pain and swelling in her feet and lower legs while at work. She has received letters for work requesting accomodation and reports that her job has not been allowing for these accommodations.   Denies uterine contractions.  Denies vaginal bleeding or leaking of fluid.  Reports adequate fetal movement of at least 10 movements in 2 hours once daily.    Updated work letter provided.   Reviewed premature labor precautions and fetal kick counts.  Advised to continue medications and return in 2 weeks.      Current Outpatient Medications   Medication Instructions    Prenatal Vit-Fe Fumarate-FA (prenatal multivitamin) 28-0.8 MG TABS 1 tablet, Oral, Daily         G2 Problems (from 10/28/24 to present)       Problem Noted Diagnosed Resolved    31 weeks gestation of pregnancy 2024 by Alda Solano MD  No    Overview Addendum 3/10/2025  2:11 PM by Jolanta Crockett MD   Labs  Pap smear 2024 NILM, GC/CT negative   Prenatal panel wnl  NIPS low risk   MSAFP screen negative  28w labs wnl  Vaccines:  Flu vaccine: declined   Covid vaccine: one dose before pregnancy  Tdap vaccine: received 3/10/25  Contraception: considering OCPs vs Nexplanon vs IUD  Feeding plan: breastfeeding  Birth plan:  with epidural  Delivery consent: signed 3/10/25  Ultrasounds:   24 13w1d: Normal US. Return for level II.   25: normal anatomy, no further US needed

## 2025-03-31 NOTE — LETTER
March 31, 2025     Patient: Charlotte Murray  YOB: 1995  Date of Visit: 3/31/2025      To Whom it May Concern:    Charlotte Murray is under my professional care. Charlotte was seen in my office on 3/31/2025. Charlotte may return to work with limitations. Due to increasing leg pain and leg swelling, we recommend that for the remainder of her pregnancy she should be allowed to sit down for at least 5 min of every hour and allowed to use the restroom at least once every 2 hours. She should not be lifting more than 20 lbs.    If you have any questions or concerns, please don't hesitate to call.         Sincerely,          WILBERTO Cowan        CC: No Recipients

## 2025-04-16 ENCOUNTER — HOSPITAL ENCOUNTER (OUTPATIENT)
Facility: HOSPITAL | Age: 30
Discharge: HOME | End: 2025-04-16
Attending: OBSTETRICS & GYNECOLOGY | Admitting: OBSTETRICS & GYNECOLOGY
Payer: MEDICARE

## 2025-04-16 VITALS — DIASTOLIC BLOOD PRESSURE: 66 MMHG | HEART RATE: 102 BPM | OXYGEN SATURATION: 97 % | SYSTOLIC BLOOD PRESSURE: 115 MMHG

## 2025-04-16 PROBLEM — Z3A.33 33 WEEKS GESTATION OF PREGNANCY: Status: ACTIVE | Noted: 2024-11-25

## 2025-04-16 LAB
BILIRUB UR QL STRIP: NEGATIVE
CLARITY UR: CLEAR
COLOR UR: NORMAL
GLUCOSE UR STRIP-MCNC: NEGATIVE MG/DL
HGB UR QL STRIP.AUTO: NEGATIVE
KETONES UR STRIP-MCNC: NEGATIVE MG/DL
LEUKOCYTE ESTERASE UR QL STRIP: NEGATIVE
NITRITE UR QL STRIP: NEGATIVE
PH UR STRIP.AUTO: 6 [PH]
PROT UR STRIP-MCNC: NEGATIVE MG/DL
SP GR UR STRIP.AUTO: 1.01 (ref 1–1.03)
UROBILINOGEN UR STRIP-ACNC: <2 MG/DL

## 2025-04-16 PROCEDURE — 99213 OFFICE O/P EST LOW 20 MIN: CPT | Performed by: OBSTETRICS & GYNECOLOGY

## 2025-04-16 PROCEDURE — 99203 OFFICE O/P NEW LOW 30 MIN: CPT

## 2025-04-16 PROCEDURE — 81003 URINALYSIS AUTO W/O SCOPE: CPT

## 2025-04-16 NOTE — PROGRESS NOTES
Triage Note - OB  Charlotte Murray 29 y.o. female MRN: [id number]  Unit/Bed#:  TRIAGE 2-01 Encounter: [id number]    OB TRIAGE NOTE  Charlotte Abrahamet  [id number]  2025  5:50 PM  LD TRIAGE 2/LD TRIAGE 2-*    ASSESS:  29 y.o.  33w2d with contractions, not in labor at this time.    PLAN:    #1. R/o PTL  SVE /-3  Microscopy neg, UA ordered  Continue Tylenol PRN for pain  Utilize heating pads PRN  Monitor for vaginal discharge/fluid and bleeding.  Monitor for increasing frequency and strength of contractions.    #2. Discharge instructions  Patient instructed to call if experiencing worsening contractions, vaginal bleeding, loss of fluid or decreased fetal movement.  Will follow up with OBGYN on  for her 34 week prenatal visit     D/w Dr. Knapp  ______________    SUBJECTIVE    KAY: Estimated Date of Delivery: 25    HPI Chronology:  29 y.o.  33w2d presents complaining of intermittent contractions. She had three contractions today and three a couple days ago.     Patient denies consistent headaches, vision changes such as blurry vision or floaters, chest pain, palpitations, constipation, nausea, vomiting, RUQ pain, and swelling of her hands and face, vaginal burning or itching, dysuria, hematuria.    Contractions: infrequent and irregular  Leakage: none  Bleeding: none  Fetal Movement: present and frequent  Pelvic pain: with contractions    Vitals:   /66   Pulse 102   LMP 2024   SpO2 97%   There is no height or weight on file to calculate BMI.    Review of Systems   HENT:  Negative for facial swelling.    Eyes:  Negative for visual disturbance.   Respiratory:  Negative for shortness of breath (with exertion).    Cardiovascular:  Negative for chest pain and palpitations.   Gastrointestinal:  Negative for constipation, nausea and vomiting.   Genitourinary:  Negative for dysuria, hematuria, vaginal bleeding, vaginal discharge and vaginal pain.   Musculoskeletal:  Back  pain: associated with her abdominal/pelvic pain.   Skin:  Negative for rash.   Neurological:  Negative for headaches.       Physical Exam  Exam conducted with a chaperone present.   Constitutional:       General: She is not in acute distress.     Appearance: Normal appearance. She is normal weight.   Cardiovascular:      Rate and Rhythm: Normal rate and regular rhythm.      Pulses:           Posterior tibial pulses are 2+ on the right side and 2+ on the left side.      Heart sounds: Normal heart sounds. No murmur heard.     No friction rub. No gallop.      Comments: Edema is non-pitting.  Pulmonary:      Effort: Pulmonary effort is normal. No respiratory distress.      Breath sounds: Normal breath sounds. No wheezing, rhonchi or rales.   Abdominal:      General: Bowel sounds are normal.      Palpations: Abdomen is soft.   Genitourinary:     General: Normal vulva.      Exam position: Supine.      Shaw stage (genital): 5.   Musculoskeletal:      Right lower leg: Edema present.      Left lower leg: Edema present.   Neurological:      Mental Status: She is alert.         FHT:  Baseline Rate (FHR): 130 bpm  Variability: Moderate  Accelerations: 15 x 15 or greater, At variable times  Decelerations: None  TOCO:  Contraction Frequency (minutes): Occasional  Contraction Duration (seconds): 60  Contraction Intensity: Mild    Labs:   Recent Results (from the past 24 hours)   UA w Reflex to Microscopic w Reflex to Culture    Collection Time: 04/16/25  4:57 PM    Specimen: Urine, Clean Catch   Result Value Ref Range    Color, UA Light Yellow     Clarity, UA Clear     Specific Gravity, UA 1.012 1.003 - 1.030    pH, UA 6.0 4.5, 5.0, 5.5, 6.0, 6.5, 7.0, 7.5, 8.0    Leukocytes, UA Negative Negative    Nitrite, UA Negative Negative    Protein, UA Negative Negative mg/dl    Glucose, UA Negative Negative mg/dl    Ketones, UA Negative Negative mg/dl    Urobilinogen, UA <2.0 <2.0 mg/dl mg/dl    Bilirubin, UA Negative Negative    Occult  Blood, UA Negative Negative       Lab, Imaging and other studies: I have personally reviewed pertinent reports.    JEFFERY and NS slides: No clue cells, trichomonads, or branching hyphae. Normal vaginal cells.    KATELIN Hilliard MD  4/16/2025  5:50 PM

## 2025-04-21 ENCOUNTER — ROUTINE PRENATAL (OUTPATIENT)
Dept: OBGYN CLINIC | Facility: CLINIC | Age: 30
End: 2025-04-21

## 2025-04-21 VITALS
DIASTOLIC BLOOD PRESSURE: 73 MMHG | RESPIRATION RATE: 18 BRPM | BODY MASS INDEX: 32.52 KG/M2 | WEIGHT: 195.2 LBS | HEIGHT: 65 IN | HEART RATE: 109 BPM | SYSTOLIC BLOOD PRESSURE: 114 MMHG

## 2025-04-21 DIAGNOSIS — O26.03 MATERNAL EXCESSIVE WEIGHT GAIN, THIRD TRIMESTER: ICD-10-CM

## 2025-04-21 DIAGNOSIS — O40.3XX0 POLYHYDRAMNIOS IN THIRD TRIMESTER COMPLICATION, SINGLE OR UNSPECIFIED FETUS: ICD-10-CM

## 2025-04-21 DIAGNOSIS — Z3A.34 34 WEEKS GESTATION OF PREGNANCY: Primary | ICD-10-CM

## 2025-04-21 PROBLEM — O40.9XX0 POLYHYDRAMNIOS: Status: ACTIVE | Noted: 2025-04-21

## 2025-04-21 NOTE — PROGRESS NOTES
[facility]OM[facility]   PRENATAL VISIT  Charlotte Cuevas Escofet  [id number]  1995        A/P:  Problem List       34 weeks gestation of pregnancy    Overview   Labs  Pap smear 2024 NILM, GC/CT negative   Prenatal panel wnl  NIPS low risk   MSAFP screen negative  28w labs wnl  Vaccines:  Flu vaccine: declined   Covid vaccine: one dose before pregnancy  Tdap vaccine: received 3/10/25  Contraception: considering OCPs vs Nexplanon vs IUD  Feeding plan: breastfeeding  Birth plan:  with epidural  Delivery consent: signed 3/10/25  Ultrasounds:   24 13w1d: Normal US. Return for level II.   25: normal anatomy, no further US needed         Polyhydramnios    Overview   LVP 8.97 cm on US performed to confirm cephalic presentation  [facility]erred to [facility] for [facility]         Maternal excessive weight gain, third trimester    Overview   Fundal height above expected for 34w, recommend growth US                S: 29 y.o.  28w0d here for PN visit. She denies contractions. She denies leakage of fluid and vaginal bleeding. She has felt good fetal movement.     O:  Vitals:    25 1556   BP: 114/73   Pulse: (!) 109   Resp: 18       Physical Exam  GEN: The patient was alert and oriented x3, pleasant well-appearing female in no acute distress.   CV: Regular rate  PULM: Non-labored respirations  MSK: Normal gait  Skin: Warm, dry  Neuro: No focal deficits  Psych: Normal affect and judgement, cooperative      Fetal Heart Rate: 145       D/w Dr. Home Crockett MD  OB/GYN PGY-4  2025  5:00 PM

## 2025-04-24 ENCOUNTER — TELEPHONE (OUTPATIENT)
Dept: OBGYN CLINIC | Facility: CLINIC | Age: 30
End: 2025-04-24

## 2025-04-24 NOTE — TELEPHONE ENCOUNTER
Patient called office with concerns of recent swelling of hands, feet, face and new numbness of hands. Patient was advised to resent to Hospital for evaluation when possible. Patient was agreeable to plan and had no further questions.

## 2025-04-25 NOTE — PATIENT INSTRUCTIONS
Patient Education      Patient Education     El movimiento de nava bebé antes de nacer   Conceptos Básicos   Redactado por los médicos y editores de [employer]   ¿Cuándo debería empezar a sentir que mi bebé se mueve? -- Depende. La mayoría de las personas sienten por primera vez que nava bebé se mueve en el útero entre las 16 y las 20 semanas de embarazo aproximadamente. Podría tardar más en sentir el movimiento si es nava primer embarazo o si la placenta se encuentra en la parte delantera del útero.  ¿Qué tipo de movimientos debería sentir? -- La primera vez que siente que nava bebé se mueve, podría ser jose paty vibración suave en el área del estómago. A veces, esto se denomina “primeros movimientos fetales”. A medida que el bebé crezca, josy movimientos serán más andrei. Probablemente lo sentirá patear, jolie vueltas y estirarse. Más adelante en el embarazo, es posible que pueda tanvi y sentir desde afuera al bebé moviéndose.  Podría notar que nava bebé está más activo en ciertos momentos del día o de la noche. Incluso antes del nacimiento, los bebés tienen periodos de sueño y de vigilia. Cuando el bebé esté durmiendo, podría notar que no se mueve tanto.  ¿Debería llevar un registro de los movimientos de mi bebé? -- Si tiene un embarazo saludable, probablemente no tenga que contar ni registrar los movimientos de nava bebé. Sentir movimientos regulares es paty buena señal de que el bebé se encuentra jane.  En algunos casos, el médico o la partera podría pedirle que lleve un registro de los movimientos de nava bebé. Si es así, le dirá cómo hacerlo y cuándo llamar.  Un cambio en los movimientos de nava bebé no siempre significa que haya un problema. Sin embargo, en algunos casos puede ser paty señal de que el bebé tiene algún problema. Si nava médico o partera tiene alguna inquietud, puede hacer pruebas para tanvi cómo está el bebé.  Si me piden que lleve un registro del movimiento, ¿cómo riri hacerlo? -- Hay distintas maneras de llevar un  registro del movimiento de nava bebé. En ocasiones, esto se denomina “conteo de patadas”.  Nava médico o partera le dirá exactamente qué debe registrar. Por ejemplo, podría pedirle que anote:   Cuánto tarda en sentir 10 patadas o movimientos   Cuántas veces se mueve nava bebé en 1 hora  Muchos expertos consideran que al menos 10 movimientos en 2 horas son paty señal de que el bebé se encuentra jane, katrina no hay paty cantidad específica de movimiento que indique exactamente si el bebé está jane de henry o no. Algunos bebés son más activos que otros, y algunas personas embarazadas sienten el movimiento con más facilidad que otras. El objetivo principal del conteo de patadas es conocer los patrones normales de nava bebé para darse cuenta de si algo cambia.  Si cuenta las patadas:   Elija paty hora del día en la que el bebé suela estar activo.   Busque un lugar tranquilo sin distracciones.   Acuéstese de lado en paty posición cómoda.   Briana el reloj, o use un temporizador.   Cada vez que sienta que nava bebé se mueve o patea, anote la hora. Algunas personas usan paty aplicación para teléfonos inteligentes para llevar el registro.   Si nava bebé parece menos activo de lo habitual, pruebe a moverse, comer un refrigerio y vaciar la vejiga. [place] puede ayudar a despertar al bebé si está dormido.   Deje de contar después de meliton sentido 10 patadas, o al finalizar el plazo que nava médico o partera le haya indicado.  ¿Cuándo riri llamar al médico? -- Llame a nava médico o partera para pedir consejo si:   Le preocupa el movimiento de nava bebé.   Nava bebé se mueve menos de lo habitual.   Observa un cambio repentino en el patrón de movimientos de nava bebé.   Tiene otros síntomas que le preocupan.  Todos los artículos se actualizan a medida que se descubre nueva evidencia y culmina nuestro proceso de evaluación por homólogos   Nivia artículo se recuperó de Up[employer] el: Feb 26, 2024.  Artículo 361086 Versión [id number].es-419.1  Release: 32.2.4 - C32.56  © 2024  [employer] Todos los derechos reservados.  Exención de responsabilidad y uso de la información del consumidor   Descargo de responsabilidad: esta información generalizada es un resumen limitado de información sobre el diagnóstico, el tratamiento y/o los medicamentos. No pretende ser exhaustiva y se debe utilizar jose herramienta para ayudar al usuario a comprender y/o evaluar las posibles opciones de diagnóstico y tratamiento. No incluye toda la información sobre afecciones, tratamientos, medicamentos, efectos secundarios o riesgos puedan ser aplicables a un paciente específico. No tiene el propósito de servir jose recomendación médica ni de sustituir la recomendación médica, el diagnóstico o el tratamiento de un profesional de atención médica que se base en el examen y la evaluación de miguel ángel profesional de la henry respecto a las circunstancias específicas y únicas del paciente. Los pacientes deben hablar con un profesional de atención médica para obtener información completa sobre nava henry, cuestiones médicas y opciones de tratamiento, incluidos los riesgos o los beneficios relacionados con el uso de medicamentos. Esta información no certifica que los tratamientos o medicamentos kenneth seguros, eficaces o estén aprobados para tratar a un paciente específico. Faizan Osei y josy afiliados renuncian a cualquier garantía o responsabilidad relacionada con esta información o el uso de la misma.El uso de esta información está sujeto a las Condiciones de uso, disponibles en https://www.Lell[employer]er.com/en/know/clinical-effectiveness-terms. 2024© UpToDate, Inc. y josy afiliados y/o licenciantes. Todos los derechos reservados.  Copyright   © 2024 [employer]Date, Inc. Todos los derechos reservados.

## 2025-04-27 NOTE — PROGRESS NOTES
Please refer to the [facility] ultrasound report in Ob Procedures for additional information regarding today's visit

## 2025-04-28 ENCOUNTER — ULTRASOUND (OUTPATIENT)
Dept: PERINATAL CARE | Facility: OTHER | Age: 30
End: 2025-04-28
Payer: MEDICARE

## 2025-04-28 VITALS
WEIGHT: 196.6 LBS | BODY MASS INDEX: 32.76 KG/M2 | SYSTOLIC BLOOD PRESSURE: 118 MMHG | HEIGHT: 65 IN | HEART RATE: 94 BPM | DIASTOLIC BLOOD PRESSURE: 74 MMHG

## 2025-04-28 DIAGNOSIS — O36.63X0 LARGE FOR GESTATIONAL AGE FETUS AFFECTING MOTHER, ANTEPARTUM, THIRD TRIMESTER, SINGLE GESTATION: ICD-10-CM

## 2025-04-28 DIAGNOSIS — O40.3XX0 POLYHYDRAMNIOS IN SINGLETON PREGNANCY IN THIRD TRIMESTER: ICD-10-CM

## 2025-04-28 DIAGNOSIS — Z3A.35 35 WEEKS GESTATION OF PREGNANCY: Primary | ICD-10-CM

## 2025-04-28 DIAGNOSIS — O26.03 MATERNAL EXCESSIVE WEIGHT GAIN, THIRD TRIMESTER: ICD-10-CM

## 2025-04-28 PROCEDURE — 76816 OB US FOLLOW-UP PER FETUS: CPT | Performed by: OBSTETRICS & GYNECOLOGY

## 2025-04-28 NOTE — LETTER
April 28, 2025     [facility] PROVIDER    Patient: Charlotte Murray   YOB: 1995   Date of Visit: 4/28/2025       Dear Dr. ESCALANTE [facility] PROVIDER:    Thank you for referring Charlotte Murray to me for evaluation. Below are my notes for this consultation.    If you have questions, please do not hesitate to call me. I look forward to following your patient along with you.         Sincerely,        Marcellus Hanley MD        CC: No Recipients    Marcellus Hanley MD  4/27/2025 12:30 PM  Sign when Signing Visit  Please refer to the [facility] ultrasound report in Ob Procedures for additional information regarding today's visit

## 2025-05-05 ENCOUNTER — ROUTINE PRENATAL (OUTPATIENT)
Dept: OBGYN CLINIC | Facility: CLINIC | Age: 30
End: 2025-05-05

## 2025-05-05 VITALS
BODY MASS INDEX: 32.99 KG/M2 | DIASTOLIC BLOOD PRESSURE: 65 MMHG | WEIGHT: 198 LBS | HEART RATE: 99 BPM | HEIGHT: 65 IN | SYSTOLIC BLOOD PRESSURE: 127 MMHG

## 2025-05-05 DIAGNOSIS — O40.3XX0 POLYHYDRAMNIOS IN THIRD TRIMESTER COMPLICATION, SINGLE OR UNSPECIFIED FETUS: ICD-10-CM

## 2025-05-05 DIAGNOSIS — Z78.9 NEED FOR FOLLOW-UP BY SOCIAL WORKER: ICD-10-CM

## 2025-05-05 DIAGNOSIS — Z3A.36 36 WEEKS GESTATION OF PREGNANCY: ICD-10-CM

## 2025-05-05 DIAGNOSIS — Z34.93 PRENATAL CARE IN THIRD TRIMESTER, UNSPECIFIED GRAVIDITY: Primary | ICD-10-CM

## 2025-05-05 DIAGNOSIS — O26.03 MATERNAL EXCESSIVE WEIGHT GAIN, THIRD TRIMESTER: ICD-10-CM

## 2025-05-05 PROCEDURE — 99213 OFFICE O/P EST LOW 20 MIN: CPT | Performed by: OBSTETRICS & GYNECOLOGY

## 2025-05-05 PROCEDURE — 87150 DNA/RNA AMPLIFIED PROBE: CPT

## 2025-05-05 NOTE — ASSESSMENT & PLAN NOTE
Concerned for patient's mental and emotional well being due to her current physical state and the stress she is under at work (requires intense physical labor)  Note written to try and help her take leave early  Will continue to discuss with our , Sonya

## 2025-05-05 NOTE — PROGRESS NOTES
[facility] WOMEN'S HE[facility]   PRENATAL VISIT  Charlotte Cuevas Escofet  [id number]  1995        A/P:  Problem List       36 weeks gestation of pregnancy    Overview   Labs  Pap smear 11/2024 NILM, GC/CT negative   Prenatal panel wnl  NIPS low risk   MSAFP screen negative  28w labs wnl  Vaccines:  Flu vaccine: declined   Covid vaccine: one dose before pregnancy  Tdap vaccine: received 3/10/25  Contraception: considering OCPs vs Nexplanon vs IUD  Feeding plan: breastfeeding  Birth plan: 1LTCS  Delivery consent: signed 3/10/25  Ultrasounds:   11/26/24 13w1d: Normal US. Return for level II.   1/21/25: normal anatomy  4/28/25: growth US with EFW 90%, repeat in 3 weeks         Current Assessment & Plan   Concerned for patient's mental and emotional well being due to her current physical state and the stress she is under at work (requires intense physical labor)  Note written to try and help her take leave early  Will continue to discuss with our , Sonya         Polyhydramnios    Overview   LVP 8.97 cm on US performed to confirm cephalic presentation  Confirmed on MFM ultrasound 4/28         Maternal excessive weight gain, third trimester    Overview   MFM US on 4/28:    AC             34.17 cm        38 weeks 1 day * (>99%)  BPD             9.20 cm        37 weeks 3 days* (97%)  HC             31.97 cm        36 weeks 0 days* (41%)  Femur           6.58 cm        33 weeks 6 days* (17%)     EFW [place] 4   3033 grams - 6 lbs 11 oz                 (90%)    Recommend follow up growth scan, scheduled 5/21/2025    Based on the above measurements and predicted weight, patient was counseled about an increased risk of shoulder dystocia. Aware that SD is an unpredictable and unpreventable obstetric emergency that places a pregnant woman and the fetus at risk of injury. Studies have shown that prepregnancy, antepartum, intrapartum risk factors have an extremely poor predictive value for shoulder dystocia.  Patient  with a prior shoulder dystocia are at increased risk of recurrent shoulder dystocia in a subsequent pregnancy.  Careful delivery planning is recommended, taking into account available clinical information, future pregnancy plans, and patient preference.     Patient was also counseled on the option for 1LTCS. She understands that delivery via CS would only be recommended if the estimated weight is >5,000g however she is still able to request a CS as long as she understands the risks and alternatives. We discussed the risks of anesthesia, infection, excessive bleeding, injury to adjacent structures (including bowel, bladder, and ureter), scarring, poor wound-healing. We reviewed the option for TOLAC in a future pregnancy as well as the risk of uterine rupture and placenta accreta/percreta with each additional CS. Currently does not plan to have more than 2 children. Reviewed potential need for life-saving hysterectomy and/or blood transfusion, accepts CPR and blood products.    She verbalized understanding of the risks, benefits, alternatives.  She does desire to proceed with elective primary  section after our extensive discussion today, regardless of repeat growth measurements.         Current Assessment & Plan   Will try to schedule patient for 1LTCS at 39 weeks or later          Other Visit Diagnoses         Prenatal care in third trimester, unspecified     -  Primary      Need for follow-up by                     S: 29 y.o.  36w0d here for PN visit. She denies contractions but has been experiencing low back pain and bilateral lower extremity swelling. She also has intermittent right arm and hand numbness with swelling. She denies leakage of fluid and vaginal bleeding. She has felt good fetal movement.     She was tearful and very overwhelmed during her visit today. Work is causing her significant physical and mental stress.    O:  Vitals:    25 1400   BP: 127/65   Pulse:  99     Physical Exam  GEN: The patient was alert and oriented x3, pleasant well-appearing female in no acute distress.   CV: Regular rate  PULM: Non-labored respirations  MSK: Normal gait  Skin: Warm, dry  Neuro: No focal deficits  Psych: Normal affect and judgement, cooperative      Fetal Heart Rate: 140       D/w Dr. Home Crockett MD  OB/GYN PGY-4  5/5/2025  5:09 PM

## 2025-05-05 NOTE — LETTER
May 5, 2025     Patient: Charlotte Murray  YOB: 1995  Date of Visit: 2025      To Whom it May Concern:    Charlotte Murray is under my professional care. Charlotte was seen in my office on 2025. Charlotte is in the late 3rd trimester of pregnancy and is experiencing significant physical symptoms which are further exacerbated by the physical labor required by her job. Her swelling makes it difficult to stand for long periods of time and she is experiencing arm/hand numbness as well which puts her at risk of injuring herself. Additionally, I am concerned for her mental and emotional well being as a result. This is is evidenced by the high score noted on her recent depression screening which is concerning for the safety of both her and her baby. I recommend that she be allowed to take leave from work with pay effective immediately until she has recovered postpartum. She will most likely require delivery via  section and she should be allowed at least 6 weeks to recover since it is a major abdominal surgery.      If you have any questions or concerns, please don't hesitate to call.         Sincerely,          Jolanta Crockett MD        CC: MIKE Loving

## 2025-05-06 ENCOUNTER — PATIENT OUTREACH (OUTPATIENT)
Dept: OBGYN CLINIC | Facility: CLINIC | Age: 30
End: 2025-05-06

## 2025-05-06 ENCOUNTER — TELEPHONE (OUTPATIENT)
Dept: OBGYN CLINIC | Facility: CLINIC | Age: 30
End: 2025-05-06

## 2025-05-06 NOTE — PROGRESS NOTES
SHANI GUARDADO met with Pt on her request. Pt presents very overwhelmed and tearful due to work issues. Pt reported she recently had a scare at work because she need to go up and down 3 flights of stairs and she almost fell down. Pt endorses feelings depressed ands anxious to go to work because she does not feel safe.    Pt stated her employer asked her for a letter form provider stating when she need to stop working. Pt spoke with provider today. SHANI GUARDADO provided listening counseling and encouraged Pt to speak with HR to get informed about the process.     SHANI GUARDADO spoke with Pt and letter was written for Pt.

## 2025-05-06 NOTE — PROGRESS NOTES
SHANI GUARDADO called Pt to inform her that the letter was in her chart. Pt will take it to work tomorrow. SHANI GUARDADO inquire about who was her nurse from [facility] and Pt provided phone number for Tonie Craig.    Called was  made to Tonie and message was left to request the call.

## 2025-05-06 NOTE — TELEPHONE ENCOUNTER
Pt called office asking if we can fax to her work place her restrictions given to her yesterday, I faxed letter to fax number [phone], informed pt letter was faxed out today and was confirmed of being received by other party, pt verbalized understanding and was grateful no further questions asked.

## 2025-05-07 LAB — GP B STREP DNA SPEC QL NAA+PROBE: POSITIVE

## 2025-05-10 ENCOUNTER — ANESTHESIA EVENT (INPATIENT)
Dept: ANESTHESIOLOGY | Facility: HOSPITAL | Age: 30
End: 2025-05-10
Payer: MEDICARE

## 2025-05-10 ENCOUNTER — ANESTHESIA (INPATIENT)
Dept: ANESTHESIOLOGY | Facility: HOSPITAL | Age: 30
End: 2025-05-10
Payer: MEDICARE

## 2025-05-10 ENCOUNTER — HOSPITAL ENCOUNTER (INPATIENT)
Facility: HOSPITAL | Age: 30
LOS: 2 days | Discharge: HOME | End: 2025-05-12
Attending: OBSTETRICS & GYNECOLOGY | Admitting: OBSTETRICS & GYNECOLOGY
Payer: MEDICARE

## 2025-05-10 DIAGNOSIS — Z3A.36 36 WEEKS GESTATION OF PREGNANCY: Primary | ICD-10-CM

## 2025-05-10 DIAGNOSIS — K59.01 SLOW TRANSIT CONSTIPATION: ICD-10-CM

## 2025-05-10 PROBLEM — J45.909 ASTHMA: Status: ACTIVE | Noted: 2025-05-10

## 2025-05-10 PROBLEM — O42.90 AMNIOTIC FLUID LEAKING: Status: ACTIVE | Noted: 2025-05-10

## 2025-05-10 LAB
ABO GROUP BLD: NORMAL
BASE EXCESS BLDCOV CALC-SCNC: -5.5 MMOL/L (ref 1–9)
BLD GP AB SCN SERPL QL: NEGATIVE
ERYTHROCYTE [DISTWIDTH] IN BLOOD BY AUTOMATED COUNT: 13.9 % (ref 11.6–15.1)
HCO3 BLDCOV-SCNC: 17.7 MMOL/L (ref 12.2–28.6)
HCT VFR BLD AUTO: 40.4 % (ref 34.8–46.1)
HGB BLD-MCNC: 13.6 G/DL (ref 11.5–15.4)
MCH RBC QN AUTO: 31.4 PG (ref 26.8–34.3)
MCHC RBC AUTO-ENTMCNC: 33.7 G/DL (ref 31.4–37.4)
MCV RBC AUTO: 93 FL (ref 82–98)
OXYHGB MFR BLDCOV: 87.3 %
PCO2 BLDCOV: 28.7 MM HG (ref 27–43)
PH BLDCOV: 7.41 [PH] (ref 7.19–7.49)
PLATELET # BLD AUTO: 226 THOUSANDS/UL (ref 149–390)
PMV BLD AUTO: 10.2 FL (ref 8.9–12.7)
PO2 BLDCOV: 43.7 MM HG (ref 15–45)
RBC # BLD AUTO: 4.33 MILLION/UL (ref 3.81–5.12)
RH BLD: POSITIVE
SAO2 % BLDCOV: 16.9 ML/DL
SPECIMEN EXPIRATION DATE: NORMAL
TREPONEMA PALLIDUM IGG+IGM AB [PRESENCE] IN SERUM OR PLASMA BY IMMUNOASSAY: NORMAL
WBC # BLD AUTO: 14.49 THOUSAND/UL (ref 4.31–10.16)

## 2025-05-10 PROCEDURE — 82805 BLOOD GASES W/O2 SATURATION: CPT | Performed by: OBSTETRICS & GYNECOLOGY

## 2025-05-10 PROCEDURE — 86901 BLOOD TYPING SEROLOGIC RH(D): CPT

## 2025-05-10 PROCEDURE — NC001 PR NO CHARGE: Performed by: OBSTETRICS & GYNECOLOGY

## 2025-05-10 PROCEDURE — 59409 OBSTETRICAL CARE: CPT | Performed by: OBSTETRICS & GYNECOLOGY

## 2025-05-10 PROCEDURE — 4A1HXCZ MONITORING OF PRODUCTS OF CONCEPTION, CARDIAC RATE, EXTERNAL APPROACH: ICD-10-PCS | Performed by: OBSTETRICS & GYNECOLOGY

## 2025-05-10 PROCEDURE — 85027 COMPLETE CBC AUTOMATED: CPT

## 2025-05-10 PROCEDURE — 6A550ZT PHERESIS OF CORD BLOOD STEM CELLS, SINGLE: ICD-10-PCS | Performed by: OBSTETRICS & GYNECOLOGY

## 2025-05-10 PROCEDURE — 99212 OFFICE O/P EST SF 10 MIN: CPT

## 2025-05-10 PROCEDURE — 86780 TREPONEMA PALLIDUM: CPT

## 2025-05-10 PROCEDURE — 86900 BLOOD TYPING SEROLOGIC ABO: CPT

## 2025-05-10 PROCEDURE — 86850 RBC ANTIBODY SCREEN: CPT

## 2025-05-10 RX ORDER — ROPIVACAINE HYDROCHLORIDE 5 MG/ML
INJECTION, SOLUTION EPIDURAL; INFILTRATION; PERINEURAL AS NEEDED
Status: DISCONTINUED | OUTPATIENT
Start: 2025-05-10 | End: 2025-05-10 | Stop reason: HOSPADM

## 2025-05-10 RX ORDER — FAMOTIDINE 10 MG/ML
20 INJECTION, SOLUTION INTRAVENOUS EVERY 12 HOURS PRN
Status: DISCONTINUED | OUTPATIENT
Start: 2025-05-10 | End: 2025-05-10

## 2025-05-10 RX ORDER — LIDOCAINE HYDROCHLORIDE AND EPINEPHRINE 20; 5 MG/ML; UG/ML
INJECTION, SOLUTION EPIDURAL; INFILTRATION; INTRACAUDAL; PERINEURAL AS NEEDED
Status: DISCONTINUED | OUTPATIENT
Start: 2025-05-10 | End: 2025-05-10 | Stop reason: HOSPADM

## 2025-05-10 RX ORDER — IBUPROFEN 600 MG/1
600 TABLET, FILM COATED ORAL EVERY 6 HOURS
Status: DISCONTINUED | OUTPATIENT
Start: 2025-05-10 | End: 2025-05-12 | Stop reason: HOSPADM

## 2025-05-10 RX ORDER — CALCIUM CARBONATE 500 MG/1
1000 TABLET, CHEWABLE ORAL DAILY PRN
Status: DISCONTINUED | OUTPATIENT
Start: 2025-05-10 | End: 2025-05-10

## 2025-05-10 RX ORDER — DIPHENHYDRAMINE HYDROCHLORIDE 50 MG/ML
25 INJECTION, SOLUTION INTRAMUSCULAR; INTRAVENOUS EVERY 6 HOURS PRN
Status: DISCONTINUED | OUTPATIENT
Start: 2025-05-10 | End: 2025-05-12 | Stop reason: HOSPADM

## 2025-05-10 RX ORDER — POLYETHYLENE GLYCOL 3350 17 G/17G
17 POWDER, FOR SOLUTION ORAL DAILY PRN
Status: DISCONTINUED | OUTPATIENT
Start: 2025-05-10 | End: 2025-05-12

## 2025-05-10 RX ORDER — ONDANSETRON 2 MG/ML
4 INJECTION INTRAMUSCULAR; INTRAVENOUS EVERY 6 HOURS PRN
Status: DISCONTINUED | OUTPATIENT
Start: 2025-05-10 | End: 2025-05-10

## 2025-05-10 RX ORDER — OXYTOCIN/RINGER'S LACTATE 30/500 ML
PLASTIC BAG, INJECTION (ML) INTRAVENOUS
Status: COMPLETED
Start: 2025-05-10 | End: 2025-05-10

## 2025-05-10 RX ORDER — BENZOCAINE/MENTHOL 6 MG-10 MG
1 LOZENGE MUCOUS MEMBRANE DAILY PRN
Status: DISCONTINUED | OUTPATIENT
Start: 2025-05-10 | End: 2025-05-12 | Stop reason: HOSPADM

## 2025-05-10 RX ORDER — ACETAMINOPHEN 325 MG/1
650 TABLET ORAL EVERY 4 HOURS PRN
Status: DISCONTINUED | OUTPATIENT
Start: 2025-05-10 | End: 2025-05-12 | Stop reason: HOSPADM

## 2025-05-10 RX ORDER — CALCIUM CARBONATE 500 MG/1
1000 TABLET, CHEWABLE ORAL DAILY PRN
Status: DISCONTINUED | OUTPATIENT
Start: 2025-05-10 | End: 2025-05-12 | Stop reason: HOSPADM

## 2025-05-10 RX ORDER — ONDANSETRON 2 MG/ML
4 INJECTION INTRAMUSCULAR; INTRAVENOUS EVERY 8 HOURS PRN
Status: DISCONTINUED | OUTPATIENT
Start: 2025-05-10 | End: 2025-05-12 | Stop reason: HOSPADM

## 2025-05-10 RX ORDER — METOCLOPRAMIDE HYDROCHLORIDE 5 MG/ML
10 INJECTION INTRAMUSCULAR; INTRAVENOUS EVERY 6 HOURS PRN
Status: DISCONTINUED | OUTPATIENT
Start: 2025-05-10 | End: 2025-05-10

## 2025-05-10 RX ORDER — BUPIVACAINE HYDROCHLORIDE 2.5 MG/ML
30 INJECTION, SOLUTION EPIDURAL; INFILTRATION; INTRACAUDAL; PERINEURAL ONCE AS NEEDED
Status: DISCONTINUED | OUTPATIENT
Start: 2025-05-10 | End: 2025-05-10

## 2025-05-10 RX ORDER — SODIUM CHLORIDE, SODIUM LACTATE, POTASSIUM CHLORIDE, CALCIUM CHLORIDE 600; 310; 30; 20 MG/100ML; MG/100ML; MG/100ML; MG/100ML
125 INJECTION, SOLUTION INTRAVENOUS CONTINUOUS
Status: DISCONTINUED | OUTPATIENT
Start: 2025-05-10 | End: 2025-05-11

## 2025-05-10 RX ADMIN — SODIUM CHLORIDE, SODIUM LACTATE, POTASSIUM CHLORIDE, AND CALCIUM CHLORIDE 999.9 ML/HR: .6; .31; .03; .02 INJECTION, SOLUTION INTRAVENOUS at 03:11

## 2025-05-10 RX ADMIN — SODIUM CHLORIDE 5 MILLION UNITS: 0.9 INJECTION, SOLUTION INTRAVENOUS at 03:49

## 2025-05-10 RX ADMIN — ONDANSETRON 4 MG: 2 INJECTION INTRAMUSCULAR; INTRAVENOUS at 03:24

## 2025-05-10 RX ADMIN — ROPIVACAINE HYDROCHLORIDE 4 ML: 5 INJECTION EPIDURAL; INFILTRATION; PERINEURAL at 04:43

## 2025-05-10 RX ADMIN — ROPIVACAINE HYDROCHLORIDE 10 ML/HR: 5 INJECTION EPIDURAL; INFILTRATION; PERINEURAL at 04:51

## 2025-05-10 RX ADMIN — SODIUM CHLORIDE, SODIUM LACTATE, POTASSIUM CHLORIDE, AND CALCIUM CHLORIDE 125 ML/HR: .6; .31; .03; .02 INJECTION, SOLUTION INTRAVENOUS at 04:45

## 2025-05-10 RX ADMIN — ROPIVACAINE HYDROCHLORIDE: 2 INJECTION, SOLUTION EPIDURAL; INFILTRATION at 11:46

## 2025-05-10 RX ADMIN — ROPIVACAINE HYDROCHLORIDE 5 ML: 5 INJECTION EPIDURAL; INFILTRATION; PERINEURAL at 04:47

## 2025-05-10 RX ADMIN — ROPIVACAINE HYDROCHLORIDE: 2 INJECTION, SOLUTION EPIDURAL; INFILTRATION at 04:45

## 2025-05-10 RX ADMIN — SODIUM CHLORIDE 2.5 MILLION UNITS: 9 INJECTION, SOLUTION INTRAVENOUS at 07:31

## 2025-05-10 RX ADMIN — Medication: at 14:28

## 2025-05-10 RX ADMIN — LIDOCAINE HYDROCHLORIDE,EPINEPHRINE BITARTRATE 4 ML: 20; .005 INJECTION, SOLUTION EPIDURAL; INFILTRATION; INTRACAUDAL; PERINEURAL at 04:39

## 2025-05-10 RX ADMIN — ONDANSETRON 4 MG: 2 INJECTION INTRAMUSCULAR; INTRAVENOUS at 11:42

## 2025-05-10 RX ADMIN — ACETAMINOPHEN 650 MG: 325 TABLET, FILM COATED ORAL at 19:33

## 2025-05-10 RX ADMIN — BENZOCAINE AND LEVOMENTHOL 1 APPLICATION: 200; 5 SPRAY TOPICAL at 18:19

## 2025-05-10 RX ADMIN — SODIUM CHLORIDE 2.5 MILLION UNITS: 9 INJECTION, SOLUTION INTRAVENOUS at 11:46

## 2025-05-10 RX ADMIN — IBUPROFEN 600 MG: 600 TABLET ORAL at 21:48

## 2025-05-10 NOTE — ASSESSMENT & PLAN NOTE
Admit to OBGYN   Clear liquid diet   F/u T&S, CBC, RPR   IVF LR 125cc/hr   Continuous fetal monitoring and tocometry   Analgesia at maternal request   Vertex by TAUS

## 2025-05-10 NOTE — ANESTHESIA POSTPROCEDURE EVALUATION
Post-Op Assessment Note    CV Status:  Stable    Pain management: adequate      Post-op block assessment: no complications and catheter intact   Mental Status:  Awake   Hydration Status:  Stable   PONV Controlled:  Controlled   Airway Patency:  Patent     Post Op Vitals Reviewed: Yes    No anethesia notable event occurred.    Staff: Anesthesiologist           Last Filed PACU Vitals:  Vitals Value Taken Time   Temp     Pulse 101 05/10/25 1459   /57 05/10/25 1459   Resp     SpO2

## 2025-05-10 NOTE — H&P
H & P- Obstetrics   Charlotte Murray 29 y.o. female MRN: [id number]  Unit/Bed#: -01 Encounter: [id number]    ObGyn Provider: [facility] [place]  Assessment: 29 y.o.  at 36w5d (2025, by Last Menstrual Period) admitted for  Spontaneous Rupture of Membranes.  SVE: /-2  FHT Category 1  Clinical EFW:  3033 g (90%ile) @ 35w0d; Cephalic confirmed by BSUS  GBS: positive  Postpartum contraception plan: OCPs vs Nexplanon vs IUD    Plan:   Amniotic fluid leaking  Assessment & Plan  Admit to OBGYN   Clear liquid diet   F/u T&S, CBC, RPR   IVF LR 125cc/hr   Continuous fetal monitoring and tocometry   Analgesia at maternal request   Vertex by TAUS    Maternal excessive weight gain, third trimester  Assessment & Plan  At risk of macrosomia. Counseled on 1LTCS due to AC>99%tile and EFW 90%tile at visit on   - Pt desires vaginal delivery attempt    Polyhydramnios  Assessment & Plan  HAY 23.4 on last U/S      Discussed case and plan w/ Jersey Braxton MD    Chief Complaint: My water broke  HPI: Charlotte Murray is a 29 y.o.  with an KAY of 2025, by Last Menstrual Period at 36w5d who is being admitted for Spontaneous Rupture of Membranes. She endorses Hardesty Bucio contractions, has large amounts of fluid leaking, and reports scant VB. She states she has felt good FM.    ROS  General: No fevers, chills or night sweats  Cardiovascular: No chest pain, or wheezing  HEENT: No visual changes  GI: No diarrhea, No blood in stools or black stools  : No dysuria or hematuria    No Known Allergies  OB History    Para Term  AB Living   2 0 0 0 1 0   SAB IAB Ectopic Multiple Live Births   0 1 0 0 0      # Outcome Date GA Lbr Jerald/2nd Weight Sex Type Anes PTL Lv   2 Current            1 IAB               Obstetric Comments    - pregnancy termination     Past Medical History:   Diagnosis Date    Abnormal Pap smear of cervix      & : ASCUS    Asthma     Atypical  squamous cells of undetermined significance (ASCUS) on Papanicolaou smear of cervix 07/17/2019    Needs repeat PAP in 1 year      Chlamydia 2019,2022    Hemorrhagic cyst of right ovary 06/12/2019    Infertility, female 01/24/2022    Migraine      No past surgical history on file.  Social History     Tobacco Use   Smoking Status Never   Smokeless Tobacco Never       Medications Prior to Admission:     Prenatal Vit-Fe Fumarate-FA (prenatal multivitamin) 28-0.8 MG TABS    Objective:  Temp:  [98.5 °F (36.9 °C)-98.7 °F (37.1 °C)] 98.5 °F (36.9 °C)  HR:  [92] 92  BP: (116)/(76) 116/76  Resp:  [17] 17  SpO2:  [98 %] 98 %  There is no height or weight on file to calculate BMI.     General Physical Exam  General: The patient was alert and oriented x3, pleasant well-appearing female in no acute distress  Lungs: Non-labored breathing  Abdomen: Soft, Non-tender, Gravid  Lower extremities: Non-tender  Psych: Normal affect and judgement, cooperative    Labs:  Lab Results   Component Value Date    WBC 14.49 (H) 05/10/2025    HGB 13.6 05/10/2025    HCT 40.4 05/10/2025     05/10/2025     Lab Results   Component Value Date    K 3.4 (L) 10/31/2023     10/31/2023    CO2 21 10/31/2023    BUN 6 (L) 10/31/2023    CREATININE 0.56 10/31/2023    AST 20 10/31/2023    ALT 18 10/31/2023       Prenatal Labs: Reviewed   Lab Results   Component Value Date    ABO A 02/22/2025    RH Positive 02/22/2025    ABS Negative 02/22/2025    STREPGRPB Positive (A) 05/05/2025    LABCHLA Negative 11/25/2024    LABNGO Negative 11/25/2024    SYPHILISAB Non-reactive 11/05/2024    HIVAGAB Non-Reactive 01/25/2022    HEPBSAG Non-reactive 11/05/2024    HEPCAB Non-reactive 11/05/2024    SCO8QMTN12GP 123 02/22/2025    RUBELLAIGGQT 79.7 11/05/2024    PRIMINTERP Negative for intraepithelial lesion or malignancy 11/25/2024        >2 Midnights  INPATIENT     Signature/Title:  Lorena Garza MD  Date: 5/10/2025  Time: 3:44 AM

## 2025-05-10 NOTE — ASSESSMENT & PLAN NOTE
At risk of macrosomia. Counseled on 1LTCS due to AC>99%tile and EFW 90%tile at visit on 5/5  - Pt desires vaginal delivery attempt

## 2025-05-10 NOTE — DISCHARGE SUMMARY
Discharge Summary - Charlotte Murray 29 y.o. female MRN: [id number]    Unit/Bed#: -01 Encounter: [id number]    ADMISSION  Admission Date: 5/10/2025   Admitting Attending: Dr. Sandra Mcgill MD  Admitting Diagnoses:   Patient Active Problem List   Diagnosis    36 weeks gestation of pregnancy    Maternal excessive weight gain, third trimester    Asthma     (spontaneous vaginal delivery)    Slow transit constipation       DELIVERY  Delivery Method: Vaginal, Spontaneous   Delivery Date and Time: 5/10/2025 2:27 PM  Delivery Attending: Sandra Mcgill    DISCHARGE  Discharge Date: 25   Discharge Attending: Dr. Rowe  Discharge Diagnosis:   Same, Delivered    Clinical course: Admission to Delivery  Charlotte Murray is a 29 y.o.  who was admitted at 36w5d for spontaneous ROM. Initial SVE was 4/80/-2. Pt was in spontaneous labor and managed expectantly. She received epidural for pain management. She progressed to complete cervical dilation and began pushing.    For pain control in labor, pt received epidural.  The labor course was uncomplicated.  She progressed to complete and began pushing.      Delivery  Route of Delivery: Vaginal, Spontaneous    Anesthesia: Epidural,   QBL: Non-Surgical QBL (mL): 75      QBL:        Delivery: Vaginal, Spontaneous at 5/10/2025 2:27 PM  Laceration: Perineal: 2° Repaired? Yes    Baby's Weight: 3540 g (7 lb 12.9 oz); 124.87    Apgar scores: 9  and 9  at 1 and 5 minutes, respectively      Clinical Course: Post-Delivery:  The post delivery course was unremarkable.    On the day of discharge, the patient was ambulating, voiding spontaneously, tolerating oral intake, and hemodynamically stable. She was able to reasonably perform all ADLs. She had appropriate bowel function. Pain was well-controlled. She was discharged home on postpartum/postop day #2 without complications. Patient was instructed to follow up with her OB as an outpatient and was given  appropriate warnings to call her provider with problems or concerns.    Pertinent lab findings included:   Blood type A positive.     Last three Hgb values:  Lab Results   Component Value Date    HGB 13.6 05/10/2025    HGB 13.0 2025    HGB 14.0 2024       (spontaneous vaginal delivery)  - Pain well controlled with oral analgesics  - Voiding spontaneously  - Tolerating PO fluids and solids  - Ambulating without difficulty  - Contraception: condoms, declined nexplanon at hospital - information packet provided and counseling done, patient will f/u with clinic to discuss if she wants nexplanon or not  - Breastfeeding  - Anticipate discharge today - patient feels ready for discharge     Slow transit constipation  -miralax 17g daily, colace 100mg bid  -1 suppository per patient preference  Asthma  -well controlled, mild intermittent asthma without confirmatory spirometry test. Currently not on inhaler as outpatient. F/u with PCP after discharge      Discharge med list:  Contraception: condoms at this time, desires information for nexplanon     Medication List      START taking these medications     acetaminophen 325 mg tablet; Commonly known as: TYLENOL; Take 2 tablets   (650 mg total) by mouth every 4 (four) hours as needed for mild pain   ibuprofen 600 mg tablet; Commonly known as: MOTRIN; Take 1 tablet (600   mg total) by mouth every 6 (six) hours as needed for mild pain   polyethylene glycol 17 g packet; Commonly known as: MIRALAX; Take 17 g   by mouth daily     CONTINUE taking these medications     prenatal multivitamin 28-0.8 MG Tabs; Take 1 tablet by mouth daily       Condition at discharge:   good     Contraception:   Condoms    Disposition:   Home    Planned Readmission:   No

## 2025-05-10 NOTE — ANESTHESIA PROCEDURE NOTES
Epidural Block    Patient location during procedure: OB/L&D  Start time: 5/10/2025 4:39 AM  Reason for block: primary anesthetic  Staffing  Performed by: Dragan Payton DO  Authorized by: Dragan Payton DO    Preanesthetic Checklist  Completed: patient identified, IV checked, site marked, risks and benefits discussed, surgical consent, monitors and equipment checked, pre-op evaluation and timeout performed  Epidural  Patient position: sitting  Prep: Betadine  Sedation Level: no sedation  Patient monitoring: heart rate, frequent blood pressure checks and continuous pulse oximetry  Approach: midline  Location: lumbar, L3-4  Injection technique: ESEQUIEL saline  Needle  Needle type: Tuohy   Needle insertion depth: 7 cm  Catheter type: side hole and multi-orifice  Catheter size: 20 G  Catheter at skin depth: 11 cm  Catheter securement method: tape  Test dose: negative  Assessment  Number of attempts: 1negative aspiration for CSF, negative aspiration for heme and no paresthesia on injection  patient tolerated the procedure well with no immediate complications

## 2025-05-10 NOTE — OB LABOR/OXYTOCIN SAFETY PROGRESS
Labor Progress Note - Charlotte Cuevas Escofet 29 y.o. female MRN: [id number]    Unit/Bed#: -01 Encounter: [id number]       Contraction Frequency (minutes): 1.5-4.5  Contraction Intensity: Mild  Uterine Activity Characteristics: Irregular  Cervical Dilation: 9        Cervical Effacement: 100  Fetal Station: 2  Baseline Rate (FHR): 130 bpm  Fetal Heart Rate (FHT): 150 BPM  FHR Category: 1               Vital Signs:   Vitals:    05/10/25 1031   BP: 112/65   Pulse: 93   Resp:    Temp:    SpO2:        Notes/comments:   Patient feeling more pressure. SVE 9/100/+2. LOT. Plan for peanut ball for continued fetal repositioning. Anticipate vaginal delivery. Dr. Mcgill aware.     Salvador Moreno MD 5/10/2025 10:36 AM

## 2025-05-10 NOTE — L&D DELIVERY NOTE
OBGYN Vaginal Delivery Summary  Charlotte Murray 29 y.o. female MRN: [id number]  Unit/Bed#: -01 Encounter: [id number]    Predelivery Diagnosis:  1. Pregnancy at 36wk5d  2. Polyhydramnios  3. Asthma    4. Maternal excessive weight gain     Postdelivery Diagnosis:  1. Same as above  2. Delivery of viable     Procedure: spontaneous vaginal delivery, repair of periurethral and perineal laceration(s)    Attending: Dr. Mcgill    Assistant: Dr. Salvador Moreno and Dr. Eva Rangel     Anesthesia: Epidural    QBL: 75 mL  Admission H.6 g/dL  Admission platelets: 226 thousands/uL    Complications: none apparent    Specimens: cord blood, arterial and venous cord blood gases, placenta to storage     Findings:   1. Viable male at 1427, with APGARS of 9 and 9 at 1 and 5 minutes respectively. Weight pending at time of dictation.  2. Spontaneous delivery of intact placenta at 1433. Three vessel, centrally inserted vessel umbilical cord  3. 2nd degree perineal laceration and periurethral laceration repaired with 3-0 synthetic suture and 4-0 synthetic suture respectively   4. Blood gases:  Umbilical Cord Venous Blood Gas:  Results from last 7 days   Lab Units 05/10/25  1428   PH COV  7.408   PCO2 COV mm HG 28.7   HCO3 COV mmol/L 17.7   BASE EXC COV mmol/L -5.5*   O2 CT CD VB mL/dL 16.9   O2 HGB, VENOUS CORD % 87.3     Umbilical Cord Arterial Blood Gas: Sample not adequate         Disposition:  Patient tolerated the procedure well and was recovering in labor and delivery room.    Brief history and labor course:  Charlotte Murray is a 29 y.o.  at 36wk5d. She presented to labor and delivery with SROM. Initial SVE was 4/80/-2. She received epidural for pain management. She progressed to complete cervical dilation and began pushing.     Description of procedure  After pushing for one hour and twenty minutes, the patient delivered a viable male  at 1427 on 5/10/25, weight pending at time of dictation,  apgars of 9 (1 min) and 9 (5 min). The fetal vertex delivered spontaneously. Baby restituted  to right occiput anterior. There was no nuchal cord. The anterior left shoulder delivered atraumatically with maternal expulsive forces and the assistance of gentle downward traction. The posterior shoulder delivered with maternal expulsive forces and the assistance of gentle upward traction. The remainder of the fetus delivered spontaneously.     Upon delivery the infant was placed on the mother's abdomen and delayed cord clamping was performed. The umbilical cord was then doubly clamped and cut. The infant was noted to cry spontaneously and was moving all extremities appropriately. There was no evidence for injury. Awaiting nurse resuscitators evaluated the . Arterial and venous cord blood gases and cord blood were collected for analysis and were promptly sent to the lab. Arterial gas sample was not adequate volume for analysis. In the immediate post-partum, IV pitocin was administered, and the uterus was noted to contract down well with massage and pitocin. The placenta delivered spontaneously at 1433 and was noted to be intact and had a three vessel, centrally inserted cord. The placenta was sent to storage.    The vagina, cervix, perineum, and rectum were inspected. Second degree perineal and first degree periurethral laceration(s) were noted. Repair was completed with 3-0 and 4-0 synthetic suture, respectively.    At the conclusion of the procedure, all needle, sponge, and instrument counts were noted to be correct. Patient tolerated the procedure well and was allowed to recover in labor and delivery room with family and  before being transferred to the post-partum floor.     Dr. Mcgill was present and participated in all key portions of the case.    Eva Rangel DO  5/10/2025  4:23 PM

## 2025-05-10 NOTE — OB LABOR/OXYTOCIN SAFETY PROGRESS
Labor Progress Note - Charlotte Cuevas Escofet 29 y.o. female MRN: [id number]    Unit/Bed#: -01 Encounter: [id number]       Contraction Frequency (minutes): 1.5-5  Contraction Intensity: Strong  Uterine Activity Characteristics: Regular  Cervical Dilation: 10  Dilation Complete Date: 05/10/25  Dilation Complete Time: 1255  Cervical Effacement: 100  Fetal Station: 3  Baseline Rate (FHR): 135 bpm  Fetal Heart Rate (FHT): 150 BPM  FHR Category: 1               Vital Signs:   Vitals:    05/10/25 1145   BP: 99/55   Pulse: 105   Resp:    Temp:    SpO2:        Notes/comments:   SVE 10/100/+3. ROP. FHT category 1. Plan to start pushing. Dr. Mcgill present.     Salvador Moreno MD 5/10/2025 1:29 PM

## 2025-05-10 NOTE — ANESTHESIA PREPROCEDURE EVALUATION
Procedure:  LABOR ANALGESIA    Relevant Problems   ANESTHESIA (within normal limits)      CARDIO (within normal limits)      GI/HEPATIC (within normal limits)      /RENAL (within normal limits)      GYN   (+) 36 weeks gestation of pregnancy      PULMONARY   (+) Asthma      Obstetrics/Gynecology   (+) Amniotic fluid leaking   (+) Polyhydramnios        Physical Exam    Airway    Mallampati score: II         Dental   No notable dental hx     Cardiovascular  Cardiovascular exam normal    Pulmonary  Pulmonary exam normal Breath sounds clear to auscultation    Other Findings  post-pubertal.      Anesthesia Plan  ASA Score- 2     Anesthesia Type- epidural with ASA Monitors.         Additional Monitors:     Airway Plan:            Plan Factors-    Chart reviewed.   Existing labs reviewed. Patient summary reviewed.    Patient is not a current smoker.              Induction-     Postoperative Plan-     Perioperative Resuscitation Plan - Level 1 - Full Code.       Informed Consent- Anesthetic plan and risks discussed with patient.        NPO Status:  No vitals data found for the desired time range.

## 2025-05-11 PROBLEM — O42.90 AMNIOTIC FLUID LEAKING: Status: RESOLVED | Noted: 2025-05-10 | Resolved: 2025-05-11

## 2025-05-11 PROCEDURE — 99024 POSTOP FOLLOW-UP VISIT: CPT | Performed by: OBSTETRICS & GYNECOLOGY

## 2025-05-11 RX ADMIN — IBUPROFEN 600 MG: 600 TABLET ORAL at 10:12

## 2025-05-11 RX ADMIN — PRENATAL VIT W/ FE FUMARATE-FA TAB 27-0.8 MG 1 TABLET: 27-0.8 TAB at 10:12

## 2025-05-11 RX ADMIN — IBUPROFEN 600 MG: 600 TABLET ORAL at 04:07

## 2025-05-11 RX ADMIN — IBUPROFEN 600 MG: 600 TABLET ORAL at 15:10

## 2025-05-11 RX ADMIN — IBUPROFEN 600 MG: 600 TABLET ORAL at 21:10

## 2025-05-11 NOTE — PLAN OF CARE
Problem: Knowledge Deficit  Goal: Verbalizes understanding of labor plan  Description: Assess patient/family/caregiver's baseline knowledge level and ability to understand information.  Provide education via patient/family/caregiver's preferred learning method at appropriate level of understanding. 1. Provide teaching at level of understanding.2. Provide teaching via preferred learning method(s).  Outcome: Progressing  Goal: Patient/family/caregiver demonstrates understanding of disease process, treatment plan, medications, and discharge instructions  Description: Complete learning assessment and assess knowledge base.Interventions:- Provide teaching at level of understanding- Provide teaching via preferred learning methods  Outcome: Progressing     Problem: Labor & Delivery  Goal: Manages discomfort  Description: Assess and monitor for signs and symptoms of discomfort.  Assess patient's pain level regularly and per hospital policy.  Administer medications as ordered. Support use of nonpharmacological methods to help control pain such as distraction, imagery, relaxation, and application of heat and cold.  Collaborate with interdisciplinary team and patient to determine appropriate pain management plan.1. Include patient in decisions related to comfort.2. Offer non-pharmacological pain management interventions.3. Report ineffective pain management to physician.  Outcome: Progressing  Goal: Patient vital signs are stable  Description: 1. Assess vital signs - vaginal delivery.  Outcome: Progressing     Problem: PAIN - ADULT  Goal: Verbalizes/displays adequate comfort level or baseline comfort level  Description: Interventions:- Encourage patient to monitor pain and request assistance- Assess pain using appropriate pain scale- Administer analgesics based on type and severity of pain and evaluate response- Implement non-pharmacological measures as appropriate and evaluate response- Consider cultural and social  influences on pain and pain management- Notify physician/advanced practitioner if interventions unsuccessful or patient reports new pain  Outcome: Progressing     Problem: INFECTION - ADULT  Goal: Absence or prevention of progression during hospitalization  Description: INTERVENTIONS:- Assess and monitor for signs and symptoms of infection- Monitor lab/diagnostic results- Monitor all insertion sites, i.e. indwelling lines, tubes, and drains- Monitor endotracheal if appropriate and nasal secretions for changes in amount and color- Drift appropriate cooling/warming therapies per order- Administer medications as ordered- Instruct and encourage patient and family to use good hand hygiene technique- Identify and instruct in appropriate isolation precautions for identified infection/condition  Outcome: Progressing  Goal: Absence of fever/infection during neutropenic period  Description: INTERVENTIONS:- Monitor WBC  Outcome: Progressing     Problem: DISCHARGE PLANNING  Goal: Discharge to home or other facility with appropriate resources  Description: INTERVENTIONS:- Identify barriers to discharge w/patient and caregiver- Arrange for needed discharge resources and transportation as appropriate- Identify discharge learning needs (meds, wound care, etc.)- Arrange for interpretive services to assist at discharge as needed- Refer to Case Management Department for coordinating discharge planning if the patient needs post-hospital services based on physician/advanced practitioner order or complex needs related to functional status, cognitive ability, or social support system  Outcome: Progressing

## 2025-05-11 NOTE — LACTATION NOTE
This note was copied from a baby's chart.  CONSULT - LACTATION  Baby Boy (Charlotte) Mason Murray 1 days male MRN: [id number]    [facility] NURSERY Room / Bed: (N)/(N) Encounter: [id number]    Maternal Information     MOTHER:  Charlotte Wen  Maternal Age: 29 y.o.  OB History: # 1 - Date: None, Sex: None, Weight: None, GA: None, Type: None, Apgar1: None, Apgar5: None, Living: None, Birth Comments: None    # 2 - Date: 05/10/25, Sex: Male, Weight: 3540 g (7 lb 12.9 oz), GA: 36w5d, Type: Vaginal, Spontaneous, Apgar1: 9, Apgar5: 9, Living: Living, Birth Comments: None   Previous breast reduction surgery? No    Lactation history:   Has patient previously breast fed: No   How long had patient previously breast fed:     Previous breast feeding complications:     No past surgical history on file.    Birth information:  YOB: 2023   Time of birth: 2:27 PM   Sex: male   Delivery type: Vaginal, Spontaneous   Birth Weight: 3540 g (7 lb 12.9 oz)   Percent of Weight Change: -2%     Gestational Age: 36w5d   [unfilled]    Reason for Consult:    Reason for Consult: Initial assessment (ext) - 20 min, Other (Comment)    Risk Factors:    Risk Factors: Language barrier, LPI (#229001, LGA)    Breast and nipple assessment:   Breasts/Nipples  Left Breast: Soft  Right Breast: Soft  Left Nipple: Everted  Right Nipple: Everted  Intervention: Hand expression  Breastfeeding Progress: Not yet established    OB Lactation Tools:    Other OB Lactation Tools  Feeding Devices: Bottle    Breast Pump:    Breast Pump  Pump: Personal ([employer]ansin[employer] Discreet.)       Assessment: normal assessment, LPI 36w5d    Feeding assessment:  No latch assessed. Baby fed before encounter. Reviewed latching & positioning and encouraged paced bottle feeding.    LATCH:  Latch: Grasps breast, tongue down, lips flanged, rhythmic sucking   Audible Swallowing: A few with stimulation   Type of Nipple:  Everted (After stimulation)   Comfort (Breast/Nipple): Soft/non-tender   Hold (Positioning): Partial assist, teach one side, mother does other, staff holds   LATCH Score: 8       Feeding recommendations:  breast feed on demand, supplement as family desires. Every 2-3 hours, watching for early feeding cues. At least 8-12 feedings in 24 hours. Mixed feeding intent.       Pashto  used (#[id number]) via MeetMe.    Consulted with Charlotte for an initial visit.    Charlotte reports that she is breastfeeding and supplementing with formula with the intention in providing both to baby. Charlotte reports that baby is latching well but she feels as baby does not like it because he tends to pull off the breast. Charlotte also reports that she does not feel as baby is getting any milk.     Educated that colostrum is thick and sticky and comes in small amounts in the first few days. Reviewed that breast milk will start to transition day 3-5.  Reviewed baby tends to pull of breast when repositioning latch and that is normal for baby to do that.    Baby was not due to feed but patient requesting assistance in positioning and latching baby. Patient has mostly breast fed in cradle position and wanted to practice football position. Baby was brought to breast for practice. Hand on hand demonstration was provided.    Discussed to position baby to breast level using pillow support.  Stressed importance of good alignment with baby's belly facing mother and baby's ear/shoulder/hip in a straight line.  Emphasized to bring baby to breast and not breast to baby.  Encouraged patient to gently compress the breast as if offering a sandwich with fingers and thumb in parallel with baby's lips to achieve deep latch.  Encouraged to support the base of the baby's neck, avoiding holding the back of the baby's head to allow the baby to move as they need to.  Reviewed to align baby's nose to the nipple and allowing for the baby to open wide, with  the baby's chin touching the breast first.  Reviewed to use breast compression and stimulation techniques to keep baby actively sucking at the breast.    Information given and discussed breastfeeding late  infant. Reviewed can be very sleepy, lack stamina and tire easily at the breast.    Encouraged to use paced bottle feeding technique. Discussed to sit baby up in an upright position and to introduce the bottle at a horizontal level; allowing the baby to pace the feed.  Reviewed how paced bottle feeding protects breastfeeding relationship, prevents over feeding and gassiness.    Feeding plan:  Patient is encouraged to breastfeed on demand, every 2-3 hours or when baby showing early feeding cues.   Discussed the importance of ensuring that baby feeds 8-12x in 24 hours and not waiting over 3 hours to feed. Educated to watch the baby for hunger and fullness cues.  Supplement using paced bottle feeding.    Reviewed baby's belly size and cluster feeding. Discussed cluster feeding is a normal baby behavior and helps bring in a good milk supply.     Discussed how to know the baby is feeding adequately:  -Baby effectively feeding at least 8-12 times in 24 hours.  -Baby is showing fullness cues, post feed.  -Baby is having adequate amounts of diapers and meeting goal diapers.  -Baby's weight loss is within normal ranges.     Patient was encouraged to continue to document baby's feedings and outputs to ensure baby is adequately feeding at the breast.     Patient was encouraged to contact lactation for a latch assessment, continued support and/or questions that arise.       ZOE Guevara 2025 2:26 PM

## 2025-05-11 NOTE — ASSESSMENT & PLAN NOTE
Routine postpartum care  Encourage ambulation  Encourage familial bonding  Lactation support as needed  Pain: Motrin/Tylenol around the clock  Postpartum Contraceptive plan: discuss

## 2025-05-11 NOTE — PLAN OF CARE
Problem: Knowledge Deficit  Goal: Verbalizes understanding of labor plan  Description: Assess patient/family/caregiver's baseline knowledge level and ability to understand information.  Provide education via patient/family/caregiver's preferred learning method at appropriate level of understanding. 1. Provide teaching at level of understanding.2. Provide teaching via preferred learning method(s).  Outcome: Progressing  Goal: Patient/family/caregiver demonstrates understanding of disease process, treatment plan, medications, and discharge instructions  Description: Complete learning assessment and assess knowledge base.Interventions:- Provide teaching at level of understanding- Provide teaching via preferred learning methods  Outcome: Progressing     Problem: Labor & Delivery  Goal: Manages discomfort  Description: Assess and monitor for signs and symptoms of discomfort.  Assess patient's pain level regularly and per hospital policy.  Administer medications as ordered. Support use of nonpharmacological methods to help control pain such as distraction, imagery, relaxation, and application of heat and cold.  Collaborate with interdisciplinary team and patient to determine appropriate pain management plan.1. Include patient in decisions related to comfort.2. Offer non-pharmacological pain management interventions.3. Report ineffective pain management to physician.  Outcome: Progressing  Goal: Patient vital signs are stable  Description: 1. Assess vital signs - vaginal delivery.  Outcome: Progressing     Problem: PAIN - ADULT  Goal: Verbalizes/displays adequate comfort level or baseline comfort level  Description: Interventions:- Encourage patient to monitor pain and request assistance- Assess pain using appropriate pain scale- Administer analgesics based on type and severity of pain and evaluate response- Implement non-pharmacological measures as appropriate and evaluate response- Consider cultural and social  influences on pain and pain management- Notify physician/advanced practitioner if interventions unsuccessful or patient reports new pain  Outcome: Progressing     Problem: INFECTION - ADULT  Goal: Absence or prevention of progression during hospitalization  Description: INTERVENTIONS:- Assess and monitor for signs and symptoms of infection- Monitor lab/diagnostic results- Monitor all insertion sites, i.e. indwelling lines, tubes, and drains- Monitor endotracheal if appropriate and nasal secretions for changes in amount and color- [place] appropriate cooling/warming therapies per order- Administer medications as ordered- Instruct and encourage patient and family to use good hand hygiene technique- Identify and instruct in appropriate isolation precautions for identified infection/condition  Outcome: Progressing  Goal: Absence of fever/infection during neutropenic period  Description: INTERVENTIONS:- Monitor WBC  Outcome: Progressing     Problem: SAFETY ADULT  Goal: Patient will remain free of falls  Description: INTERVENTIONS:- Educate patient/family on patient safety including physical limitations- Instruct patient to call for assistance with activity - Consult OT/PT to assist with strengthening/mobility - Keep Call bell within reach- Keep bed low and locked with side rails adjusted as appropriate- Keep care items and personal belongings within reach- Initiate and maintain comfort rounds- Make Fall Risk Sign visible to staff- Offer Toileting every  Hours, in advance of need- Initiate/Maintain alarm- Obtain necessary fall risk management equipment: - Apply yellow socks and bracelet for high fall risk patients- Consider moving patient to room near nurses station  Outcome: Progressing  Goal: Maintain or return to baseline ADL function  Description: INTERVENTIONS:-  Assess patient's ability to carry out ADLs; assess patient's baseline for ADL function and identify physical deficits which impact ability to perform ADLs  (bathing, care of mouth/teeth, toileting, grooming, dressing, etc.)- Assess/evaluate cause of self-care deficits - Assess range of motion- Assess patient's mobility; develop plan if impaired- Assess patient's need for assistive devices and provide as appropriate- Encourage maximum independence but intervene and supervise when necessary- Involve family in performance of ADLs- Assess for home care needs following discharge - Consider OT consult to assist with ADL evaluation and planning for discharge- Provide patient education as appropriate  Outcome: Progressing  Goal: Maintains/Returns to pre admission functional level  Description: INTERVENTIONS:- Perform AM-PAC 6 Click Basic Mobility/ Daily Activity assessment daily.- Set and communicate daily mobility goal to care team and patient/family/caregiver. - Collaborate with rehabilitation services on mobility goals if consulted- Perform Range of Motion  times a day.- Reposition patient every  hours.- Dangle patient  times a day- Stand patient  times a day- Ambulate patient  times a day- Out of bed to chair  times a day - Out of bed for meals  times a day- Out of bed for toileting- Record patient progress and toleration of activity level   Outcome: Progressing     Problem: DISCHARGE PLANNING  Goal: Discharge to home or other facility with appropriate resources  Description: INTERVENTIONS:- Identify barriers to discharge w/patient and caregiver- Arrange for needed discharge resources and transportation as appropriate- Identify discharge learning needs (meds, wound care, etc.)- Arrange for interpretive services to assist at discharge as needed- Refer to Case Management Department for coordinating discharge planning if the patient needs post-hospital services based on physician/advanced practitioner order or complex needs related to functional status, cognitive ability, or social support system  Outcome: Progressing

## 2025-05-11 NOTE — ASSESSMENT & PLAN NOTE
29 yoF  postpartum day 1 - with  at 36w5d. No complications. Continue routine postpartum care  GBS positive, treated with penicillin x2 doses  Blood type A+ does not need Rhogam  Contraception - desires condoms, wants information on Nexplanon  Dispo - plan for circumcision in baby on  then family feels ready to go home  No elevated blood pressure while admitted

## 2025-05-11 NOTE — PROGRESS NOTES
Progress Note - OB/GYN   Name: Charlotte Murray 29 y.o. female I MRN: [id number]  Unit/Bed#: -01 I Date of Admission: 5/10/2025   Date of Service: 2025 I Hospital Day: 1     Assessment & Plan   (spontaneous vaginal delivery)  29 yoF  postpartum day 1 - with  at 36w5d. No complications. Continue routine postpartum care  GBS positive, treated with penicillin x2 doses  Blood type A+ does not need Rhogam  Contraception - desires condoms, wants information on Nexplanon  Dispo - plan for circumcision in baby on  then family feels ready to go home  No elevated blood pressure while admitted  Asthma  Stable respiratory status as outpatient without ongoing treatment    OB Post-Partum Progress Note  Subjective   Post delivery. Patient is doing well. Lochia WNL. Pain well controlled.     Pain: yes, cramping, improved with meds  Tolerating PO: yes  Voiding: yes  Flatus: yes  BM: not yet  Ambulating: yes  Breastfeeding: breastfeeding, formula  Chest pain: no  Shortness of breath: no  Leg pain: no  Lochia: WNL    Objective :  Temp:  [97.5 °F (36.4 °C)-98.8 °F (37.1 °C)] 97.5 °F (36.4 °C)  HR:  [] 93  BP: ()/(53-86) 117/77  Resp:  [16-17] 16  SpO2:  [98 %-100 %] 98 %  O2 Device: None (Room air)    Physical Exam  Constitutional:       General: She is not in acute distress.  HENT:      Head: Normocephalic and atraumatic.   Cardiovascular:      Rate and Rhythm: Normal rate.   Pulmonary:      Effort: Pulmonary effort is normal. No respiratory distress.   Abdominal:      General: There is no distension.   Skin:     General: Skin is warm and dry.   Neurological:      General: No focal deficit present.   Psychiatric:         Mood and Affect: Mood normal.           Lab Results: I have reviewed the following results:  Lab Results   Component Value Date    WBC 14.49 (H) 05/10/2025    HGB 13.6 05/10/2025    HCT 40.4 05/10/2025    MCV 93 05/10/2025     05/10/2025     Ita Avila  medicine resident PGY-2

## 2025-05-12 VITALS
OXYGEN SATURATION: 95 % | DIASTOLIC BLOOD PRESSURE: 77 MMHG | HEART RATE: 92 BPM | SYSTOLIC BLOOD PRESSURE: 119 MMHG | TEMPERATURE: 98.2 F | RESPIRATION RATE: 18 BRPM | WEIGHT: 198 LBS | HEIGHT: 65 IN | BODY MASS INDEX: 32.99 KG/M2

## 2025-05-12 PROBLEM — O40.9XX0 POLYHYDRAMNIOS: Status: RESOLVED | Noted: 2025-04-21 | Resolved: 2025-05-12

## 2025-05-12 PROBLEM — K59.01 SLOW TRANSIT CONSTIPATION: Status: ACTIVE | Noted: 2025-05-12

## 2025-05-12 PROCEDURE — 99024 POSTOP FOLLOW-UP VISIT: CPT | Performed by: OBSTETRICS & GYNECOLOGY

## 2025-05-12 RX ORDER — IBUPROFEN 600 MG/1
600 TABLET, FILM COATED ORAL EVERY 6 HOURS PRN
Qty: 30 TABLET | Refills: 0 | Status: SHIPPED | OUTPATIENT
Start: 2025-05-12

## 2025-05-12 RX ORDER — POLYETHYLENE GLYCOL 3350 17 G/17G
17 POWDER, FOR SOLUTION ORAL DAILY
Status: DISCONTINUED | OUTPATIENT
Start: 2025-05-12 | End: 2025-05-12 | Stop reason: HOSPADM

## 2025-05-12 RX ORDER — POLYETHYLENE GLYCOL 3350 17 G/17G
17 POWDER, FOR SOLUTION ORAL DAILY
Qty: 10 EACH | Refills: 0 | Status: SHIPPED | OUTPATIENT
Start: 2025-05-12

## 2025-05-12 RX ORDER — IBUPROFEN 600 MG/1
600 TABLET, FILM COATED ORAL ONCE
Status: DISCONTINUED | OUTPATIENT
Start: 2025-05-12 | End: 2025-05-12

## 2025-05-12 RX ORDER — DOCUSATE SODIUM 100 MG/1
100 CAPSULE, LIQUID FILLED ORAL 2 TIMES DAILY
Qty: 10 CAPSULE | Refills: 0 | OUTPATIENT
Start: 2025-05-12 | End: 2025-05-17

## 2025-05-12 RX ORDER — DOCUSATE SODIUM 100 MG/1
100 CAPSULE, LIQUID FILLED ORAL 2 TIMES DAILY
Status: DISCONTINUED | OUTPATIENT
Start: 2025-05-12 | End: 2025-05-12 | Stop reason: HOSPADM

## 2025-05-12 RX ORDER — ACETAMINOPHEN 325 MG/1
650 TABLET ORAL EVERY 4 HOURS PRN
Qty: 60 TABLET | Refills: 0 | OUTPATIENT
Start: 2025-05-12

## 2025-05-12 RX ORDER — POLYETHYLENE GLYCOL 3350 17 G/17G
17 POWDER, FOR SOLUTION ORAL DAILY
Qty: 85 G | Refills: 0 | OUTPATIENT
Start: 2025-05-12 | End: 2025-05-17

## 2025-05-12 RX ORDER — ACETAMINOPHEN 325 MG/1
650 TABLET ORAL EVERY 4 HOURS PRN
Start: 2025-05-12

## 2025-05-12 RX ORDER — IBUPROFEN 600 MG/1
600 TABLET, FILM COATED ORAL EVERY 6 HOURS
Qty: 30 TABLET | Refills: 0 | OUTPATIENT
Start: 2025-05-12

## 2025-05-12 RX ORDER — BENZOCAINE/MENTHOL 6 MG-10 MG
1 LOZENGE MUCOUS MEMBRANE DAILY PRN
Start: 2025-05-12

## 2025-05-12 RX ADMIN — IBUPROFEN 600 MG: 600 TABLET ORAL at 04:00

## 2025-05-12 RX ADMIN — IBUPROFEN 600 MG: 600 TABLET ORAL at 12:46

## 2025-05-12 RX ADMIN — ACETAMINOPHEN 650 MG: 325 TABLET, FILM COATED ORAL at 09:07

## 2025-05-12 NOTE — UTILIZATION REVIEW
"Mother and baby discharged 2025      NOTIFICATION OF INPATIENT ADMISSION   MATERNITY/DELIVERY AUTHORIZATION REQUEST   SERVICING FACILITY:   [facility] - L&D, , NICU  [address] [place] [address]  Tax ID: [id number]  NPI: [id number] ATTENDING PROVIDER:  Attending Name and NPI#: Sandra Mcgill Md [[id number]]  Address: [address] [place] [address]  Phone: [phone]     ADMISSION INFORMATION:  Place of Service: Inpatient [facility]  Place of Service Code: 21  Inpatient Admission Date/Time: 5/10/25  2:43 AM  Discharge Date/Time: 2025  1:18 PM  Admitting Diagnosis Code/Description:  No admission diagnoses are documented for this encounter.   Mother: Charlotte Murray 1995 Estimated Date of Delivery: 25  Delivering clinician: Sandra Mcgill   OB History          2    Para   1    Term   0       1    AB   1    Living   1         SAB   0    IAB   1    Ectopic   0    Multiple   0    Live Births   1           Obstetric Comments    - pregnancy termination              Name & MRN:   Information for the patient's :  Mason Murray, Baby Boy (Charlotte) [[id number]]   [place] Delivery Information:  Sex: male  Delivered 5/10/2025 2:27 PM by Vaginal, Spontaneous; Gestational Age: 36w5d    [place] Measurements:  Weight: 7 lb 12.9 oz (3540 g);  Height: 21\"    APGAR 1 minute 5 minutes 10 minutes   Totals: 9 9       UTILIZATION REVIEW CONTACT:  Anjana Brar Utilization   Network Utilization Review Department  Phone: [phone]  Fax [phone]  Email: [email or web address]  Contact for approvals/pending authorizations, clinical reviews, and discharge.   PHYSICIAN ADVISORY SERVICES:  Medical Necessity Denial & [employer] Review  Phone: [phone]  Fax: [phone]  Email: [email or web address]   DISCHARGE SUPPORT TEAM:  For Patients Discharge Needs " & Updates  Phone: [phone] opt. 2 Fax: [phone]  Email: [email or web address]

## 2025-05-12 NOTE — ASSESSMENT & PLAN NOTE
-well controlled, mild intermittent asthma without confirmatory spirometry test. Currently not on inhaler as outpatient. F/u with PCP after discharge

## 2025-05-12 NOTE — PLAN OF CARE
Problem: Knowledge Deficit  Goal: Verbalizes understanding of labor plan  Description: Assess patient/family/caregiver's baseline knowledge level and ability to understand information.  Provide education via patient/family/caregiver's preferred learning method at appropriate level of understanding. 1. Provide teaching at level of understanding.2. Provide teaching via preferred learning method(s).  Outcome: Progressing  Goal: Patient/family/caregiver demonstrates understanding of disease process, treatment plan, medications, and discharge instructions  Description: Complete learning assessment and assess knowledge base.Interventions:- Provide teaching at level of understanding- Provide teaching via preferred learning methods  Outcome: Progressing     Problem: PAIN - ADULT  Goal: Verbalizes/displays adequate comfort level or baseline comfort level  Description: Interventions:- Encourage patient to monitor pain and request assistance- Assess pain using appropriate pain scale- Administer analgesics based on type and severity of pain and evaluate response- Implement non-pharmacological measures as appropriate and evaluate response- Consider cultural and social influences on pain and pain management- Notify physician/advanced practitioner if interventions unsuccessful or patient reports new pain  Outcome: Progressing     Problem: INFECTION - ADULT  Goal: Absence or prevention of progression during hospitalization  Description: INTERVENTIONS:- Assess and monitor for signs and symptoms of infection- Monitor lab/diagnostic results- Monitor all insertion sites, i.e. indwelling lines, tubes, and drains- Monitor endotracheal if appropriate and nasal secretions for changes in amount and color- [place] appropriate cooling/warming therapies per order- Administer medications as ordered- Instruct and encourage patient and family to use good hand hygiene technique- Identify and instruct in appropriate isolation precautions for  identified infection/condition  Outcome: Progressing  Goal: Absence of fever/infection during neutropenic period  Description: INTERVENTIONS:- Monitor WBC  Outcome: Progressing     Problem: SAFETY ADULT  Goal: Patient will remain free of falls  Description: INTERVENTIONS:- Educate patient/family on patient safety including physical limitations- Instruct patient to call for assistance with activity - Consult OT/PT to assist with strengthening/mobility - Keep Call bell within reach- Keep bed low and locked with side rails adjusted as appropriate- Keep care items and personal belongings within reach- Initiate and maintain comfort rounds- Make Fall Risk Sign visible to staff- Offer Toileting everyOutcome: Progressing  Goal: Maintain or return to baseline ADL function  Description: INTERVENTIONS:-  Assess patient's ability to carry out ADLs; assess patient's baseline for ADL function and identify physical deficits which impact ability to perform ADLs (bathing, care of mouth/teeth, toileting, grooming, dressing, etc.)- Assess/evaluate cause of self-care deficits - Assess range of motion- Assess patient's mobility; develop plan if impaired- Assess patient's need for assistive devices and provide as appropriate- Encourage maximum independence but intervene and supervise when necessary- Involve family in performance of ADLs- Assess for home care needs following discharge - Consider OT consult to assist with ADL evaluation and planning for discharge- Provide patient education as appropriate  Outcome: Progressing  Goal: Maintains/Returns to pre admission functional level  Description: INTERVENTIONS:- Perform AM-PAC 6 Click Basic Mobility/ Daily Activity assessment daily.- Set and communicate daily mobility goal to care team and patient/family/caregiver. - Collaborate with rehabilitation services on mobility goals if consulted- Perform Range of Motion Outcome: Progressing     Problem: DISCHARGE PLANNING  Goal: Discharge to home  or other facility with appropriate resources  Description: INTERVENTIONS:- Identify barriers to discharge w/patient and caregiver- Arrange for needed discharge resources and transportation as appropriate- Identify discharge learning needs (meds, wound care, etc.)- Arrange for interpretive services to assist at discharge as needed- Refer to Case Management Department for coordinating discharge planning if the patient needs post-hospital services based on physician/advanced practitioner order or complex needs related to functional status, cognitive ability, or social support system  Outcome: Progressing

## 2025-05-12 NOTE — NURSING NOTE
Discharge teaching performed with patient using [employer]  Maida [id number]; educational packet provided and reviewed, as well as the save your life magnet. Patient verbalized an understanding and was encouraged to continue to ask questions prior to discharge.

## 2025-05-12 NOTE — PROGRESS NOTES
Progress Note - OB/GYN   Name: Charlotte Murray 29 y.o. female I MRN: [id number]  Unit/Bed#: -01 I Date of Admission: 5/10/2025   Date of Service: 2025 I Hospital Day: 2    Assessment & Plan   (spontaneous vaginal delivery)  - Pain well controlled with oral analgesics  - Voiding spontaneously  - Tolerating PO fluids and solids  - Ambulating without difficulty  - Contraception: condoms, declined nexplanon at hospital - information packet provided and counseling done, patient will f/u with clinic to discuss if she wants nexplanon or not  - Breastfeeding  - Anticipate discharge today - patient feels ready for discharge    Slow transit constipation  -miralax 17g daily, colace 100mg bid  -1 suppository per patient preference  Asthma  -well controlled, mild intermittent asthma without confirmatory spirometry test. Currently not on inhaler as outpatient. F/u with PCP after discharge    OB Post-Partum Progress Note  Subjective   Post delivery. Patient is doing well. Lochia WNL. Pain well controlled. ROS +constipation, colicky abdominal pain, mild lower back pain.     Pain: yes, cramping, improved with meds  Tolerating PO: yes  Voiding: yes  Flatus: yes  BM: not yet - last BM was 3-4 days ago  Ambulating: yes  Breastfeeding: yes  Chest pain: no  Shortness of breath: no  Leg pain: no  Lochia: WNL    Objective :  Temp:  [97.9 °F (36.6 °C)-98.5 °F (36.9 °C)] 98.1 °F (36.7 °C)  HR:  [] 100  BP: (106-122)/(58-79) 122/58  Resp:  [16] 16  SpO2:  [95 %] 95 %  O2 Device: None (Room air)    Physical Exam  Constitutional:       General: She is not in acute distress.  HENT:      Head: Normocephalic and atraumatic.   Cardiovascular:      Rate and Rhythm: Normal rate.   Pulmonary:      Effort: Pulmonary effort is normal. No respiratory distress.   Abdominal:      General: Abdomen is flat. There is no distension.      Palpations: Abdomen is soft.      Tenderness: There is no abdominal tenderness. There is no  guarding or rebound.   Skin:     General: Skin is warm and dry.   Neurological:      General: No focal deficit present.   Psychiatric:         Mood and Affect: Mood normal.           Lab Results: I have reviewed the following results:  Lab Results   Component Value Date    WBC 14.49 (H) 05/10/2025    HGB 13.6 05/10/2025    HCT 40.4 05/10/2025    MCV 93 05/10/2025     05/10/2025       Ita Carrasco  Family medicine PGY-2

## 2025-05-12 NOTE — ASSESSMENT & PLAN NOTE
- Pain well controlled with oral analgesics  - Voiding spontaneously  - Tolerating PO fluids and solids  - Ambulating without difficulty  - Contraception: condoms, declined nexplanon at hospital - information packet provided and counseling done, patient will f/u with clinic to discuss if she wants nexplanon or not  - Breastfeeding  - Anticipate discharge today - patient feels ready for discharge

## 2025-05-12 NOTE — LACTATION NOTE
This note was copied from a baby's chart.  CONSULT - LACTATION  Baby Boy (Charlotte) Mason Murray 2 days male MRN: [id number]    [facility] NURSERY Room / Bed: (N)/(N) Encounter: [id number]    Maternal Information     MOTHER:  Charlotte Wen  Maternal Age: 29 y.o.  OB History: # 1 - Date: None, Sex: None, Weight: None, GA: None, Type: None, Apgar1: None, Apgar5: None, Living: None, Birth Comments: None    # 2 - Date: 05/10/25, Sex: Male, Weight: 3540 g (7 lb 12.9 oz), GA: 36w5d, Type: Vaginal, Spontaneous, Apgar1: 9, Apgar5: 9, Living: Living, Birth Comments: None   Previous breast reduction surgery? No    Lactation history:   Has patient previously breast fed: No   How long had patient previously breast fed:     Previous breast feeding complications:     No past surgical history on file.    Birth information:  YOB: 2023   Time of birth: 2:27 PM   Sex: male   Delivery type: Vaginal, Spontaneous   Birth Weight: 3540 g (7 lb 12.9 oz)   Percent of Weight Change: -3%     Gestational Age: 36w5d   [unfilled]    Reason for Consult:    Reason for Consult: Discharge (ext) - 20 min, Other (Comment)    Risk Factors:    Risk Factors: Language barrier, LPI (#[id number])    Breast and nipple assessment:   Breasts/Nipples  Left Breast: Soft  Right Breast: Soft  Left Nipple: Everted  Right Nipple: Everted  Intervention: Hand expression  Breastfeeding Progress: Not yet established    OB Lactation Tools:    Other OB Lactation Tools  Feeding Devices: Bottle    Breast Pump:    Breast Pump  Pump: Personal (Lansin[employer] Du[employer] Discreet.)      [place] Assessment: normal assessment, LPI 36w5d    Feeding recommendations:  breast feed on demand supplement as family desires. Every 2-3 hours, watching for early feeding cues. At least 8-12 feedings in 24 hours. Mixed feeding intent.      I[place]ic  used (#[id number]) via [employer].    Followed up with Charlotte to discuss  breastfeeding progress and feeding plan for home.     Patient reports that she applied the education provided yesterday from lactation and feels as baby is doing better at the breast. Patient does report that baby can get fussy at the breast when latching.   Patient is breastfeeding first and then topping off with formula. Patient plans to pump at home.    Discussed pumping can protect milk supply. Reviewed to not be discouraged if small amounts are expressed in the first few days. Discussed that colostrum is thick and sticky and comes in small amounts at first. Reviewed that breast milk will start to transition day 3-5.  Reviewed duration and frequency of pumping. Encouraged to refer to DC book that was provided in [place]ese for education on breast milk storage guidelines and how to wash breast pump.    Encouraged family to continue to use paced bottle feeding to protect breastfeeding relationship.    Baby was not due for a feed, sleeping on mother's chest after feeding therefore no latch assessment was done.    Feeding plan:  Patient is encouraged to breastfeed on demand, every 2-3 hours or when baby showing early feeding cues.   Discussed the importance of ensuring that baby feeds 8-12x in 24 hours and not waiting over 3 hours to feed. Educated to watch the baby for hunger and fullness cues.  Supplement using paced bottle feeding.     Encouraged family to monitor baby's outputs, ensuring baby is reaching goal diapers.     Family denies further questions or concerns at this time.    Patient was encouraged to contact lactation for a latch assessment, continued support and/or questions that arise before discharge.    ZOE Guevara 5/12/2025 11:38 AM

## 2025-05-18 LAB — PLACENTA IN STORAGE: NORMAL

## 2025-05-21 ENCOUNTER — PATIENT OUTREACH (OUTPATIENT)
Dept: OBGYN CLINIC | Facility: CLINIC | Age: 30
End: 2025-05-21

## 2025-05-21 NOTE — PROGRESS NOTES
"SHANI GUARDADO spoke with Pt for post partum follow up. Pt reported her and baby \"Gudelia\" are doing well. Baby is feeding by breast and formula. Pt denies any post partum depression signs. Pt reported her family has been very supportive. Pt denies any concerns a this time. SHANI GUARDADO reminded Pt of her post partum visit on 6/2.   "

## 2025-06-02 ENCOUNTER — POSTPARTUM VISIT (OUTPATIENT)
Dept: OBGYN CLINIC | Facility: CLINIC | Age: 30
End: 2025-06-02

## 2025-06-02 VITALS
WEIGHT: 175 LBS | HEIGHT: 65 IN | SYSTOLIC BLOOD PRESSURE: 107 MMHG | DIASTOLIC BLOOD PRESSURE: 76 MMHG | HEART RATE: 96 BPM | BODY MASS INDEX: 29.16 KG/M2

## 2025-06-02 PROCEDURE — 87086 URINE CULTURE/COLONY COUNT: CPT | Performed by: NURSE PRACTITIONER

## 2025-06-02 PROCEDURE — 87147 CULTURE TYPE IMMUNOLOGIC: CPT | Performed by: NURSE PRACTITIONER

## 2025-06-02 RX ORDER — DOCUSATE SODIUM 100 MG/1
100 CAPSULE, LIQUID FILLED ORAL 2 TIMES DAILY
Qty: 60 CAPSULE | Refills: 1 | Status: SHIPPED | OUTPATIENT
Start: 2025-06-02

## 2025-06-02 NOTE — PROGRESS NOTES
"POSTPARTUM VISIT    Charlotte Murray presents today for postpartum visit.  She had a vaginal delivery on 5/10/25.  Complications included  labor, she delivered at 36w5d.  She is breast and formula feeding her infant and reports no issues with such.  She desires condoms for contraception.  She was provided with [place] Depression Screening tool and her score was 5.    Review of Systems:   -Constitutional: denies issues, denies pain   -Breasts: denies tenderness   -Gynecologic: lochia resolved   -Urinary: reports occasional mild dysuria since delivery    -GI: constipation     Physical Exam:   -Vitals:   Vitals:    25 1608   BP: 107/76   BP Location: Right arm   Patient Position: Sitting   Pulse: 96   Weight: 79.4 kg (175 lb)   Height: 5' 5\" (1.651 m)      -General: A&Ox3, no acute distress noted   -Abdomen: soft, non-tender   -Extremities: nontender, no edema noted   -Breasts:    Deferred    -Pelvic exam:    External genitalia: nontender, no lesions or masses, perineum intact    Vagina: pink, rugae, no lesions/masses, normal discharge        Assessment/Plan:  1. Normal postpartum exam.  2. Depression screening negative.   3. Referral ordered for postpartum Physical Therapy consultation.  4. Last pap smear was done 2024 and result was NILM.  Advise return for next annual GYN exam in November.  5. Contraception: Patient is not yet ready to resume sexual activity. She is planning on condom use when active.   6. Urine culture collected       "

## 2025-06-03 LAB — BACTERIA UR CULT: ABNORMAL

## 2025-06-04 ENCOUNTER — TELEPHONE (OUTPATIENT)
Dept: OBGYN CLINIC | Facility: CLINIC | Age: 30
End: 2025-06-04

## 2025-06-04 DIAGNOSIS — N30.00 ACUTE CYSTITIS WITHOUT HEMATURIA: Primary | ICD-10-CM

## 2025-06-04 RX ORDER — AMOXICILLIN 500 MG/1
500 CAPSULE ORAL EVERY 8 HOURS SCHEDULED
Qty: 15 CAPSULE | Refills: 0 | Status: SHIPPED | OUTPATIENT
Start: 2025-06-04 | End: 2025-06-09

## 2025-06-04 NOTE — TELEPHONE ENCOUNTER
Left VM with office number to return phone call. Please inform patient of positive UTI. Antibiotic has been sent to the pharmacy and should be taken 3 times a day for 5 days and it is safe for Breastfeeding.

## 2025-06-11 ENCOUNTER — PATIENT OUTREACH (OUTPATIENT)
Dept: OBGYN CLINIC | Facility: CLINIC | Age: 30
End: 2025-06-11

## 2025-06-11 NOTE — PROGRESS NOTES
Pt return the call. Pt reported she is doing well and denies post partum depression. Pt with question regarding nico XX and information was send via [employer]. SHANI GUARDADO provided supportive counseling. Pt denies other needs and was agreeable to case closing.